# Patient Record
Sex: FEMALE | Race: WHITE | Employment: OTHER | URBAN - METROPOLITAN AREA
[De-identification: names, ages, dates, MRNs, and addresses within clinical notes are randomized per-mention and may not be internally consistent; named-entity substitution may affect disease eponyms.]

---

## 2017-02-28 ENCOUNTER — HOSPITAL ENCOUNTER (OUTPATIENT)
Dept: GENERAL RADIOLOGY | Age: 67
Discharge: HOME | End: 2017-02-28
Attending: NURSE PRACTITIONER | Admitting: NURSE PRACTITIONER

## 2017-02-28 DIAGNOSIS — R05.9 COUGH: ICD-10-CM

## 2017-06-08 ENCOUNTER — HOSPITAL ENCOUNTER (OUTPATIENT)
Dept: GENERAL RADIOLOGY | Age: 67
Discharge: HOME | End: 2017-06-08
Attending: INTERNAL MEDICINE | Admitting: INTERNAL MEDICINE

## 2017-06-08 LAB
ALBUMIN SERPL-MCNC: 4.3 GM/DL (ref 3.4–5)
ANION GAP SERPL CALCULATED.3IONS-SCNC: 17 MMOL/L (ref 4–16)
BACTERIA: NEGATIVE /HPF
BASOPHILS ABSOLUTE: 0.1 K/CU MM
BASOPHILS RELATIVE PERCENT: 0.6 % (ref 0–1)
BILIRUBIN URINE: NEGATIVE MG/DL
BLOOD, URINE: NEGATIVE
BUN BLDV-MCNC: 29 MG/DL (ref 6–23)
CALCIUM SERPL-MCNC: 10.4 MG/DL (ref 8.3–10.6)
CHLORIDE BLD-SCNC: 97 MMOL/L (ref 99–110)
CLARITY: CLEAR
CO2: 26 MMOL/L (ref 21–32)
COLOR: ABNORMAL
CREAT SERPL-MCNC: 1.5 MG/DL (ref 0.6–1.1)
DIFFERENTIAL TYPE: ABNORMAL
EOSINOPHILS ABSOLUTE: 0.5 K/CU MM
EOSINOPHILS RELATIVE PERCENT: 3.9 % (ref 0–3)
GFR AFRICAN AMERICAN: 42 ML/MIN/1.73M2
GFR NON-AFRICAN AMERICAN: 35 ML/MIN/1.73M2
GLUCOSE BLD-MCNC: 216 MG/DL (ref 70–140)
GLUCOSE, URINE: NEGATIVE MG/DL
HCT VFR BLD CALC: 42.6 % (ref 37–47)
HEMOGLOBIN: 14.1 GM/DL (ref 12.5–16)
HYALINE CASTS: 2 /LPF
IMMATURE NEUTROPHIL %: 0.6 % (ref 0–0.43)
KETONES, URINE: NEGATIVE MG/DL
LEUKOCYTE ESTERASE, URINE: ABNORMAL
LYMPHOCYTES ABSOLUTE: 3.1 K/CU MM
LYMPHOCYTES RELATIVE PERCENT: 25.3 % (ref 24–44)
MCH RBC QN AUTO: 33 PG (ref 27–31)
MCHC RBC AUTO-ENTMCNC: 33.1 % (ref 32–36)
MCV RBC AUTO: 99.8 FL (ref 78–100)
MONOCYTES ABSOLUTE: 1.1 K/CU MM
MONOCYTES RELATIVE PERCENT: 8.7 % (ref 0–4)
MUCUS: ABNORMAL HPF
NITRITE URINE, QUANTITATIVE: NEGATIVE
NUCLEATED RBC %: 0 %
PDW BLD-RTO: 13.1 % (ref 11.7–14.9)
PH, URINE: 5 (ref 5–8)
PHOSPHORUS: 4.6 MG/DL (ref 2.5–4.9)
PLATELET # BLD: 320 K/CU MM (ref 140–440)
PMV BLD AUTO: 11.1 FL (ref 7.5–11.1)
POTASSIUM SERPL-SCNC: 4.3 MMOL/L (ref 3.5–5.1)
PROTEIN UA: NEGATIVE MG/DL
RBC # BLD: 4.27 M/CU MM (ref 4.2–5.4)
RBC URINE: 1 /HPF (ref 0–6)
SEGMENTED NEUTROPHILS ABSOLUTE COUNT: 7.5 K/CU MM
SEGMENTED NEUTROPHILS RELATIVE PERCENT: 60.9 % (ref 36–66)
SODIUM BLD-SCNC: 140 MMOL/L (ref 135–145)
SPECIFIC GRAVITY UA: 1.01 (ref 1–1.03)
SQUAMOUS EPITHELIAL: 5 /HPF
TOTAL IMMATURE NEUTOROPHIL: 0.07 K/CU MM
TOTAL NUCLEATED RBC: 0 K/CU MM
TRANSITIONAL EPITHELIAL: <1 /HPF
UROBILINOGEN, URINE: NORMAL MG/DL (ref 0.2–1)
WBC # BLD: 12.3 K/CU MM (ref 4–10.5)
WBC UA: 5 /HPF (ref 0–5)

## 2017-12-07 ENCOUNTER — HOSPITAL ENCOUNTER (OUTPATIENT)
Dept: GENERAL RADIOLOGY | Age: 67
Discharge: HOME | End: 2017-12-07
Attending: INTERNAL MEDICINE | Admitting: INTERNAL MEDICINE

## 2017-12-07 LAB
ALBUMIN SERPL-MCNC: 4.4 GM/DL (ref 3.4–5)
ANION GAP SERPL CALCULATED.3IONS-SCNC: 15 MMOL/L (ref 4–16)
BACTERIA: ABNORMAL /HPF
BILIRUBIN URINE: NEGATIVE MG/DL
BLOOD, URINE: NEGATIVE
BUN BLDV-MCNC: 35 MG/DL (ref 6–23)
CALCIUM SERPL-MCNC: 10.3 MG/DL (ref 8.3–10.6)
CHLORIDE BLD-SCNC: 98 MMOL/L (ref 99–110)
CLARITY: ABNORMAL
CO2: 26 MMOL/L (ref 21–32)
COLOR: ABNORMAL
CREAT SERPL-MCNC: 1.5 MG/DL (ref 0.6–1.1)
CREATININE URINE: 89.6 MG/DL (ref 28–217)
GFR AFRICAN AMERICAN: 42 ML/MIN/1.73M2
GFR NON-AFRICAN AMERICAN: 35 ML/MIN/1.73M2
GLUCOSE BLD-MCNC: 257 MG/DL (ref 70–99)
GLUCOSE, URINE: 50 MG/DL
HYALINE CASTS: 2 /LPF
KETONES, URINE: NEGATIVE MG/DL
LEUKOCYTE ESTERASE, URINE: NEGATIVE
MAGNESIUM: 2.3 MG/DL (ref 1.8–2.4)
MUCUS: ABNORMAL HPF
NITRITE URINE, QUANTITATIVE: NEGATIVE
PH, URINE: 5 (ref 5–8)
PHOSPHORUS: 4.5 MG/DL (ref 2.5–4.9)
POTASSIUM SERPL-SCNC: 4.4 MMOL/L (ref 3.5–5.1)
PROT/CREAT RATIO, UR: 0.2
PROTEIN UA: NEGATIVE MG/DL
RBC URINE: 2 /HPF (ref 0–6)
SODIUM BLD-SCNC: 139 MMOL/L (ref 135–145)
SPECIFIC GRAVITY UA: 1.01 (ref 1–1.03)
SQUAMOUS EPITHELIAL: 3 /HPF
TRANSITIONAL EPITHELIAL: <1 /HPF
TRICHOMONAS: ABNORMAL /HPF
URINE TOTAL PROTEIN: 14.6 MG/DL
UROBILINOGEN, URINE: NORMAL MG/DL (ref 0.2–1)
WBC UA: 3 /HPF (ref 0–5)

## 2018-06-05 ENCOUNTER — HOSPITAL ENCOUNTER (OUTPATIENT)
Dept: GENERAL RADIOLOGY | Age: 68
Discharge: HOME | End: 2018-06-05
Attending: INTERNAL MEDICINE | Admitting: INTERNAL MEDICINE

## 2018-06-05 LAB
ALBUMIN SERPL-MCNC: 4.4 GM/DL (ref 3.4–5)
ANION GAP SERPL CALCULATED.3IONS-SCNC: 16 MMOL/L (ref 4–16)
BACTERIA: NEGATIVE /HPF
BASOPHILS ABSOLUTE: 0.1 K/CU MM
BASOPHILS RELATIVE PERCENT: 0.6 % (ref 0–1)
BILIRUBIN URINE: NEGATIVE MG/DL
BLOOD, URINE: NEGATIVE
BUN BLDV-MCNC: 29 MG/DL (ref 6–23)
CALCIUM OXALATE CRYSTALS: ABNORMAL /HPF
CALCIUM SERPL-MCNC: 9.8 MG/DL (ref 8.3–10.6)
CHLORIDE BLD-SCNC: 100 MMOL/L (ref 99–110)
CLARITY: CLEAR
CO2: 25 MMOL/L (ref 21–32)
COLOR: ABNORMAL
CREAT SERPL-MCNC: 1.2 MG/DL (ref 0.6–1.1)
DIFFERENTIAL TYPE: ABNORMAL
EOSINOPHILS ABSOLUTE: 0.4 K/CU MM
EOSINOPHILS RELATIVE PERCENT: 3 % (ref 0–3)
GFR AFRICAN AMERICAN: 54 ML/MIN/1.73M2
GFR NON-AFRICAN AMERICAN: 45 ML/MIN/1.73M2
GLUCOSE BLD-MCNC: 237 MG/DL (ref 70–99)
GLUCOSE, URINE: 50 MG/DL
HCT VFR BLD CALC: 42.9 % (ref 37–47)
HEMOGLOBIN: 13.8 GM/DL (ref 12.5–16)
IMMATURE NEUTROPHIL %: 0.5 % (ref 0–0.43)
KETONES, URINE: NEGATIVE MG/DL
LEUKOCYTE ESTERASE, URINE: NEGATIVE
LYMPHOCYTES ABSOLUTE: 2.4 K/CU MM
LYMPHOCYTES RELATIVE PERCENT: 19.2 % (ref 24–44)
MAGNESIUM: 2.1 MG/DL (ref 1.8–2.4)
MCH RBC QN AUTO: 32.9 PG (ref 27–31)
MCHC RBC AUTO-ENTMCNC: 32.2 % (ref 32–36)
MCV RBC AUTO: 102.4 FL (ref 78–100)
MONOCYTES ABSOLUTE: 1 K/CU MM
MONOCYTES RELATIVE PERCENT: 7.7 % (ref 0–4)
NITRITE URINE, QUANTITATIVE: NEGATIVE
NUCLEATED RBC %: 0 %
PDW BLD-RTO: 13.5 % (ref 11.7–14.9)
PH, URINE: 6 (ref 5–8)
PHOSPHORUS: 3.6 MG/DL (ref 2.5–4.9)
PLATELET # BLD: 323 K/CU MM (ref 140–440)
PMV BLD AUTO: 11.6 FL (ref 7.5–11.1)
POTASSIUM SERPL-SCNC: 4.6 MMOL/L (ref 3.5–5.1)
PROTEIN UA: NEGATIVE MG/DL
RBC # BLD: 4.19 M/CU MM (ref 4.2–5.4)
RBC URINE: <1 /HPF (ref 0–6)
SEGMENTED NEUTROPHILS ABSOLUTE COUNT: 8.7 K/CU MM
SEGMENTED NEUTROPHILS RELATIVE PERCENT: 69 % (ref 36–66)
SODIUM BLD-SCNC: 141 MMOL/L (ref 135–145)
SPECIFIC GRAVITY UA: 1.01 (ref 1–1.03)
SQUAMOUS EPITHELIAL: 2 /HPF
TOTAL IMMATURE NEUTOROPHIL: 0.06 K/CU MM
TOTAL NUCLEATED RBC: 0 K/CU MM
TRICHOMONAS: ABNORMAL /HPF
UROBILINOGEN, URINE: NORMAL MG/DL (ref 0.2–1)
WBC # BLD: 12.5 K/CU MM (ref 4–10.5)
WBC UA: 4 /HPF (ref 0–5)

## 2018-12-04 ENCOUNTER — HOSPITAL ENCOUNTER (OUTPATIENT)
Age: 68
Discharge: HOME | End: 2018-12-04
Payer: MEDICARE

## 2018-12-04 DIAGNOSIS — E11.8 TYPE 2 DIABETES MELLITUS WITH COMPLICATION, WITHOUT LONG-TERM CURRENT USE OF INSULIN (HCC): ICD-10-CM

## 2018-12-04 DIAGNOSIS — N18.30 CHRONIC KIDNEY DISEASE, STAGE III (MODERATE) (HCC): ICD-10-CM

## 2018-12-04 DIAGNOSIS — N18.30 HYPERTENSIVE KIDNEY DISEASE WITH CKD STAGE III (HCC): ICD-10-CM

## 2018-12-04 DIAGNOSIS — E66.3 PATIENT OVERWEIGHT: ICD-10-CM

## 2018-12-04 DIAGNOSIS — F41.9 ANXIETY: ICD-10-CM

## 2018-12-04 DIAGNOSIS — I12.9 HYPERTENSIVE KIDNEY DISEASE WITH CKD STAGE III (HCC): ICD-10-CM

## 2018-12-04 DIAGNOSIS — I25.10 CORONARY ARTERY DISEASE INVOLVING NATIVE CORONARY ARTERY OF NATIVE HEART WITHOUT ANGINA PECTORIS: ICD-10-CM

## 2018-12-04 DIAGNOSIS — N18.30 CKD (CHRONIC KIDNEY DISEASE), STAGE III (HCC): ICD-10-CM

## 2018-12-04 LAB
ALBUMIN SERPL-MCNC: 4.4 GM/DL (ref 3.4–5)
ANION GAP SERPL CALCULATED.3IONS-SCNC: 13 MMOL/L (ref 4–16)
BACTERIA: NEGATIVE /HPF
BILIRUBIN URINE: NEGATIVE MG/DL
BLOOD, URINE: NEGATIVE
BUN BLDV-MCNC: 32 MG/DL (ref 6–23)
CALCIUM SERPL-MCNC: 9.8 MG/DL (ref 8.3–10.6)
CHLORIDE BLD-SCNC: 104 MMOL/L (ref 99–110)
CLARITY: CLEAR
CO2: 25 MMOL/L (ref 21–32)
COLOR: YELLOW
CREAT SERPL-MCNC: 1.3 MG/DL (ref 0.6–1.1)
GFR AFRICAN AMERICAN: 49 ML/MIN/1.73M2
GFR NON-AFRICAN AMERICAN: 41 ML/MIN/1.73M2
GLUCOSE BLD-MCNC: 233 MG/DL (ref 70–99)
GLUCOSE, URINE: 50 MG/DL
KETONES, URINE: NEGATIVE MG/DL
LEUKOCYTE ESTERASE, URINE: ABNORMAL
MAGNESIUM: 2.3 MG/DL (ref 1.8–2.4)
MUCUS: ABNORMAL HPF
NITRITE URINE, QUANTITATIVE: NEGATIVE
PH, URINE: 6 (ref 5–8)
PHOSPHORUS: 3.4 MG/DL (ref 2.5–4.9)
POTASSIUM SERPL-SCNC: 4.7 MMOL/L (ref 3.5–5.1)
PROTEIN UA: NEGATIVE MG/DL
RBC URINE: <1 /HPF (ref 0–6)
SODIUM BLD-SCNC: 142 MMOL/L (ref 135–145)
SPECIFIC GRAVITY UA: 1.01 (ref 1–1.03)
SQUAMOUS EPITHELIAL: 1 /HPF
TRICHOMONAS: ABNORMAL /HPF
UROBILINOGEN, URINE: NORMAL MG/DL (ref 0.2–1)
WBC UA: 3 /HPF (ref 0–5)

## 2018-12-04 PROCEDURE — 83735 ASSAY OF MAGNESIUM: CPT

## 2018-12-04 PROCEDURE — 36415 COLL VENOUS BLD VENIPUNCTURE: CPT

## 2018-12-04 PROCEDURE — 80069 RENAL FUNCTION PANEL: CPT

## 2018-12-04 PROCEDURE — 81001 URINALYSIS AUTO W/SCOPE: CPT

## 2020-06-16 ENCOUNTER — HOSPITAL ENCOUNTER (OUTPATIENT)
Age: 70
Discharge: HOME | End: 2020-06-16
Payer: COMMERCIAL

## 2020-06-16 LAB
ALBUMIN SERPL-MCNC: 4.2 GM/DL (ref 3.4–5)
ANION GAP SERPL CALCULATED.3IONS-SCNC: 12 MMOL/L (ref 4–16)
BACTERIA: NEGATIVE /HPF
BILIRUBIN URINE: NEGATIVE MG/DL
BLOOD, URINE: NEGATIVE
BUN BLDV-MCNC: 30 MG/DL (ref 6–23)
CALCIUM SERPL-MCNC: 9.4 MG/DL (ref 8.3–10.6)
CHLORIDE BLD-SCNC: 105 MMOL/L (ref 99–110)
CLARITY: CLEAR
CO2: 25 MMOL/L (ref 21–32)
COLOR: ABNORMAL
CREAT SERPL-MCNC: 1.3 MG/DL (ref 0.6–1.1)
CREATININE URINE: 64.7 MG/DL (ref 28–217)
GFR AFRICAN AMERICAN: 49 ML/MIN/1.73M2
GFR NON-AFRICAN AMERICAN: 41 ML/MIN/1.73M2
GLUCOSE BLD-MCNC: 220 MG/DL (ref 70–99)
GLUCOSE, URINE: 50 MG/DL
KETONES, URINE: NEGATIVE MG/DL
LEUKOCYTE ESTERASE, URINE: NEGATIVE
MAGNESIUM: 2.5 MG/DL (ref 1.8–2.4)
MUCUS: ABNORMAL HPF
NITRITE URINE, QUANTITATIVE: NEGATIVE
PH, URINE: 6 (ref 5–8)
PHOSPHORUS: 3.3 MG/DL (ref 2.5–4.9)
POTASSIUM SERPL-SCNC: 4.5 MMOL/L (ref 3.5–5.1)
PROT/CREAT RATIO, UR: 0.2
PROTEIN UA: NEGATIVE MG/DL
RBC URINE: <1 /HPF (ref 0–6)
SODIUM BLD-SCNC: 142 MMOL/L (ref 135–145)
SPECIFIC GRAVITY UA: 1.01 (ref 1–1.03)
SQUAMOUS EPITHELIAL: 1 /HPF
TRANSITIONAL EPITHELIAL: <1 /HPF
TRICHOMONAS: ABNORMAL /HPF
URINE TOTAL PROTEIN: 12.4 MG/DL
UROBILINOGEN, URINE: NORMAL MG/DL (ref 0.2–1)
WBC UA: 1 /HPF (ref 0–5)

## 2020-06-16 PROCEDURE — 36415 COLL VENOUS BLD VENIPUNCTURE: CPT

## 2020-06-16 PROCEDURE — 82570 ASSAY OF URINE CREATININE: CPT

## 2020-06-16 PROCEDURE — 80048 BASIC METABOLIC PNL TOTAL CA: CPT

## 2020-06-16 PROCEDURE — 82040 ASSAY OF SERUM ALBUMIN: CPT

## 2020-06-16 PROCEDURE — 83735 ASSAY OF MAGNESIUM: CPT

## 2020-06-16 PROCEDURE — 84100 ASSAY OF PHOSPHORUS: CPT

## 2020-06-16 PROCEDURE — 81001 URINALYSIS AUTO W/SCOPE: CPT

## 2020-06-16 PROCEDURE — 84156 ASSAY OF PROTEIN URINE: CPT

## 2021-05-13 DIAGNOSIS — I25.10 CORONARY ARTERY DISEASE INVOLVING NATIVE CORONARY ARTERY OF NATIVE HEART WITHOUT ANGINA PECTORIS: ICD-10-CM

## 2021-05-13 DIAGNOSIS — E11.8 TYPE 2 DIABETES MELLITUS WITH COMPLICATION, WITHOUT LONG-TERM CURRENT USE OF INSULIN (HCC): ICD-10-CM

## 2021-05-13 DIAGNOSIS — E66.3 PATIENT OVERWEIGHT: ICD-10-CM

## 2021-05-13 DIAGNOSIS — F41.9 ANXIETY: ICD-10-CM

## 2021-05-13 DIAGNOSIS — I12.9 HYPERTENSIVE KIDNEY DISEASE WITH STAGE 3 CHRONIC KIDNEY DISEASE, UNSPECIFIED WHETHER STAGE 3A OR 3B CKD (HCC): ICD-10-CM

## 2021-05-13 DIAGNOSIS — N18.30 HYPERTENSIVE KIDNEY DISEASE WITH STAGE 3 CHRONIC KIDNEY DISEASE, UNSPECIFIED WHETHER STAGE 3A OR 3B CKD (HCC): ICD-10-CM

## 2021-05-13 DIAGNOSIS — N18.32 STAGE 3B CHRONIC KIDNEY DISEASE (HCC): ICD-10-CM

## 2021-05-13 LAB
ALBUMIN SERPL-MCNC: 4.4 G/DL (ref 3.4–5)
ANION GAP SERPL CALCULATED.3IONS-SCNC: 12 MMOL/L (ref 3–16)
BILIRUBIN URINE: NEGATIVE
BLOOD, URINE: NEGATIVE
BUN BLDV-MCNC: 25 MG/DL (ref 7–20)
CALCIUM SERPL-MCNC: 9.8 MG/DL (ref 8.3–10.6)
CHLORIDE BLD-SCNC: 101 MMOL/L (ref 99–110)
CLARITY: CLEAR
CO2: 25 MMOL/L (ref 21–32)
COLOR: YELLOW
CREAT SERPL-MCNC: 1.2 MG/DL (ref 0.6–1.2)
CREATININE URINE: 56.6 MG/DL (ref 28–259)
GFR AFRICAN AMERICAN: 54
GFR NON-AFRICAN AMERICAN: 44
GLUCOSE BLD-MCNC: 154 MG/DL (ref 70–99)
GLUCOSE URINE: NEGATIVE MG/DL
KETONES, URINE: NEGATIVE MG/DL
LEUKOCYTE ESTERASE, URINE: NEGATIVE
MAGNESIUM: 2.3 MG/DL (ref 1.8–2.4)
MICROSCOPIC EXAMINATION: NORMAL
NITRITE, URINE: NEGATIVE
PH UA: 6 (ref 5–8)
PHOSPHORUS: 3.6 MG/DL (ref 2.5–4.9)
POTASSIUM SERPL-SCNC: 4.1 MMOL/L (ref 3.5–5.1)
PROTEIN PROTEIN: 13 MG/DL
PROTEIN UA: NEGATIVE MG/DL
PROTEIN/CREAT RATIO: 0.2 MG/DL
SODIUM BLD-SCNC: 138 MMOL/L (ref 136–145)
SPECIFIC GRAVITY UA: 1.01 (ref 1–1.03)
URINE TYPE: NORMAL
UROBILINOGEN, URINE: 0.2 E.U./DL

## 2021-06-10 PROBLEM — I48.0 PAROXYSMAL ATRIAL FIBRILLATION (HCC): Status: ACTIVE | Noted: 2021-06-10

## 2021-10-23 ENCOUNTER — HOSPITAL ENCOUNTER (EMERGENCY)
Age: 71
Discharge: HOME | End: 2021-10-23
Payer: MEDICARE

## 2021-10-23 VITALS
TEMPERATURE: 98.1 F | BODY MASS INDEX: 49.53 KG/M2 | RESPIRATION RATE: 16 BRPM | HEART RATE: 74 BPM | SYSTOLIC BLOOD PRESSURE: 164 MMHG | DIASTOLIC BLOOD PRESSURE: 72 MMHG | OXYGEN SATURATION: 95 % | WEIGHT: 293 LBS

## 2021-10-23 DIAGNOSIS — E16.2 HYPOGLYCEMIA: Primary | ICD-10-CM

## 2021-10-23 LAB
GLUCOSE BLD-MCNC: 118 MG/DL
GLUCOSE BLD-MCNC: 118 MG/DL (ref 70–99)
GLUCOSE BLD-MCNC: 137 MG/DL
GLUCOSE BLD-MCNC: 137 MG/DL (ref 70–99)
GLUCOSE BLD-MCNC: 72 MG/DL (ref 70–99)

## 2021-10-23 PROCEDURE — 82962 GLUCOSE BLOOD TEST: CPT

## 2021-10-23 PROCEDURE — 99283 EMERGENCY DEPT VISIT LOW MDM: CPT

## 2021-10-23 ASSESSMENT — PAIN SCALES - GENERAL: PAINLEVEL_OUTOF10: 5

## 2021-10-23 NOTE — ED PROVIDER NOTES
Emergency [address] EMERGENCY DEPARTMENT    Patient: Leeann Olivier  MRN: [id number]  : 1950  Date of Evaluation: 10/23/2021  ED Provider: Robert Young PA-C    Chief Complaint       Chief Complaint   Patient presents with    Hypoglycemia     EMS read 76; patient states her home monitor read [address] is a 70 y.o. female who presents to the emergency department for hypoglycemia. Patient states she woke up at 2320pm to go to the bathroom and felt weak, so she checked her sugar. She states her monitor read \"0\" so she started eating as much as she could--Twinkies, cinnamon twists, peanut butter, and 3 apple juices. She states the highest her glucometer read was 48 and then it began to trend downward, so she decided to call EMS. Per EMS, patient's glucose was 68. She states she last took her insulin--both Levemir and Novolog at 2000pm.  At this time, she complains of GI upset from eating so much. Denies fever, chills, dizziness, headache, cp, sob, n/v/d.      ROS     CONSTITUTIONAL:  Denies fever. RESPIRATORY:  Denies any shortness of breath. CARDIOVASCULAR:  Denies chest pain. GI:  Denies nausea or vomiting. :  Denies urinary symptoms.     Past History     Past Medical History:   Diagnosis Date    [facility]adder [facility] ([facility]     Brain cyst     CAD (coronary artery disease)     CHF (congestive heart failure) (HCC)     Chronic kidney disease     Congenital heart disease     Diabetes mellitus ([address]     Gastroparesis     GERD (gastroesophageal reflux disease)     Hiatal hernia     Hyperlipidemia     Hypertension     Hypothyroid     Kidney stone     Larynx cancer (HCC)     Neuropathy     Radiculopathy     Seizures ([facility]     UTI (urinary tract infection)      Past Surgical History:   Procedure Laterality Date    APPENDECTOMY      BLADDER TUMOR EXCISION      CORONARY ANGIOPLASTY WITH STENT PLACEMENT  2015     Social History     Socioeconomic History    Marital status: Single     Spouse name: Not on file    Number of children: 0    Years of education: Not on file    Highest education level: Not on file   Occupational History    Not on file   Tobacco Use    Smoking status: Former Smoker     Packs/day: 0.25     Years: 11.00     Pack years: 2.75     Quit date: 1985     Years since quittin.3    Smokeless tobacco: Former User   Substance and Sexual Activity    Alcohol use: No    Drug use: No    Sexual activity: Yes     Partners: Male   Other Topics Concern    Not on file   Social History Narrative    Not on file     Social Determinants of Health     Financial Resource Strain:     Difficulty of Paying Living Expenses:    Food Insecurity:     Worried About Running Out of Food in the Last Year:     Ran Out of Food in the Last Year:    Transportation Needs:     Lack of Transportation (Medical):      Lack of Transportation (Non-Medical):    Physical Activity:     Days of Exercise per Week:     Minutes of Exercise per Session:    Stress:     Feeling of Stress :    Social Connections:     Frequency of Communication with Friends and Family:     Frequency of Social Gatherings with Friends and Family:     Attends Yazidi Services:     Active Member of Clubs or Organizations:     Attends Club or Organization Meetings:     Marital Status:    Intimate Partner Violence:     Fear of Current or Ex-Partner:     Emotionally Abused:     Physically Abused:     Sexually Abused:        Medications/Allergies     Previous Medications    AMLODIPINE (NORVASC) 5 MG TABLET    Take 5 mg by mouth daily     ASPIRIN 81 MG CHEWABLE TABLET    Take 1 tablet by mouth daily    CARVEDILOL (COREG) 6.25 MG TABLET    Take 1 tablet by mouth 2 times daily (with meals)    CLONIDINE (CATAPRES) 0.2 MG TABLET    Take 0.2 mg by mouth nightly     ELIQUIS 5 MG TABS TABLET    TAKE 1 TABLET BY MOUTH TWICE DAILY    FENOFIBRATE (TRICOR) 54 MG TABLET    Take 54 mg by mouth daily s [facility]: Please dispense generic fenofibrate unless prescriber denote    FLUTICASONE (FLONASE) 50 MCG/ACT NASAL SPRAY    1 spray by Nasal route as needed     FUROSEMIDE (LASIX) 40 MG TABLET    Take 40 mg by mouth Twice a Week     GLIMEPIRIDE (AMARYL) 4 MG TABLET    Take 4 mg by mouth 2 times daily     INSULIN DETEMIR (LEVEMIR) 100 UNIT/ML INJECTION VIAL    Inject into the skin 2 times daily 45 units in the am and 50 units in the pm    LEVOTHYROXINE (SYNTHROID) 25 MCG TABLET    Take 25 mcg by mouth Daily. LORAZEPAM (ATIVAN) 0.5 MG TABLET    Take 0.5 mg by mouth as needed     LOSARTAN (COZAAR) 50 MG TABLET    Take 100 mg by mouth daily     NONFORMULARY    Take 1 capsule by mouth daily [facility]    NOVOLOG 100 UNIT/ML INJECTION VIAL    Inject 15 Units into the skin 4 times daily     SIMVASTATIN (ZOCOR) 20 MG TABLET    Take 1 tablet by mouth nightly    VENTOLIN  (90 BASE) MCG/ACT INHALER    as needed     VITAMIN D 1000 UNITS CAPS    Take 1 capsule by mouth daily     Allergies   Allergen Reactions    Influenza Vaccines Anaphylaxis    Pneumococcal Vaccines Anaphylaxis    Codeine     Demerol     Epinephrine     Humalog [Insulin Lispro]     Iv Dye [Iodides]     Metformin     Penicillins     Toujeo Solostar [Insulin Glargine]     Vicodin [Hydrocodone-Acetaminophen]     Victoza [Liraglutide] Other (See Comments)    Adhesive Tape Rash        Physical Exam       ED Triage Vitals [10/23/21 0057]   BP Temp Temp Source Pulse Resp SpO2 Height Weight   (!) 157/55 98.1 °F (36.7 °C) Oral 72 16 97 % -- --     GENERAL APPEARANCE:  Well-developed, well-nourished, no acute distress. HEAD:  NC/AT. EYES:  Sclera anicteric. ENT:  Ears, nose, mouth normal.     NECK:  Supple. CARDIO:  RRR. LUNGS:   CTAB. Respirations unlabored. ABDOMEN:  Soft, non-distended, non-tender. BS active. EXTREMITIES:  No acute deformities. SKIN:  Warm and dry. NEUROLOGICAL:  Alert and oriented. PSYCHIATRIC:  Normal mood. Diagnostics     Labs:  Results for orders placed or performed during the hospital encounter of 10/23/21   POCT Glucose   Result Value Ref Range    POC Glucose 72 70 - 99 MG/DL   POCT Glucose   Result Value Ref Range    Glucose 118 mg/dL   POCT Glucose   Result Value Ref Range    POC Glucose 118 (H) 70 - 99 MG/DL   POC Blood Glucose   Result Value Ref Range    Glucose 137 mg/dL   POCT Glucose   Result Value Ref Range    POC Glucose 137 (H) 70 - 99 MG/DL       ED Course and MDM   -  Patient seen and evaluated in the emergency department. -  Triage and nursing notes reviewed and incorporated. -  Old chart records reviewed and incorporated. -  Supervising physician was Dr. Ann-Marie Nicole. Patient was seen independently. -  Patient presents with an initial glucose of 72. She does not want to eat anything else and she does not want any intervention. She was agreeable to observation and repeat glucose checks to ensure stability. Patient observed in the department for over 2 hours. Glucose 137 on final check. She voiced readiness to go home. Advised on continued monitoring and compliance with medication regimen. FU with PCP, return here as needed. She is agreeable with plan of care and disposition.  -  Disposition:  Home    In light of current events, I did utilize appropriate PPE (including N95 and surgical face mask, safety glasses, and gloves, as recommended by the health facility/national standard best practice, during my bedside interactions with the patient. Final Impression      1.  Hypoglycemia          DISPOSITION Decision To Discharge 10/23/2021 02:54:21 AM      [id number], Sander Barajas  10/23/21 7874

## 2021-10-23 NOTE — ED NOTES
Patient presents to the ER with c/o of hypoglycemia. Patient woke up from sleeping at 11:30 and was dizzy and diaphoretic. Patient states her home meter read LOW. Patient drank apple juice and ate some twinkies. Patients BG in the EMS squad was 68.  [address] [facility] [address] uppon arrival read [place]  10/23/21 [id number]

## 2021-10-23 NOTE — ED NOTES
Discharge instructions explained. Patient verbalized understanding and denies any other concerns or complaints at this time. Patient vital signs stable and no acute signs or symptoms of distress noted at discharge. Patient deemed clinically stable. Patient d/c home with family.      Sanjuanita Stein RN  10/23/21 7598

## 2021-11-02 ENCOUNTER — INITIAL CONSULT (OUTPATIENT)
Dept: CARDIOLOGY CLINIC | Age: 71
End: 2021-11-02
Payer: MEDICARE

## 2021-11-02 VITALS
BODY MASS INDEX: 44.41 KG/M2 | WEIGHT: 293 LBS | HEART RATE: 65 BPM | HEIGHT: 68 IN | DIASTOLIC BLOOD PRESSURE: 60 MMHG | SYSTOLIC BLOOD PRESSURE: 138 MMHG

## 2021-11-02 DIAGNOSIS — I25.10 CORONARY ARTERY DISEASE INVOLVING NATIVE CORONARY ARTERY OF NATIVE HEART WITHOUT ANGINA PECTORIS: Primary | ICD-10-CM

## 2021-11-02 DIAGNOSIS — E11.8 TYPE 2 DIABETES MELLITUS WITH COMPLICATION, WITHOUT LONG-TERM CURRENT USE OF INSULIN (HCC): ICD-10-CM

## 2021-11-02 DIAGNOSIS — I10 PRIMARY HYPERTENSION: ICD-10-CM

## 2021-11-02 DIAGNOSIS — I48.0 PAROXYSMAL ATRIAL FIBRILLATION (HCC): ICD-10-CM

## 2021-11-02 DIAGNOSIS — N18.32 STAGE 3B CHRONIC KIDNEY DISEASE (HCC): ICD-10-CM

## 2021-11-02 DIAGNOSIS — N18.30 HYPERTENSIVE KIDNEY DISEASE WITH STAGE 3 CHRONIC KIDNEY DISEASE, UNSPECIFIED WHETHER STAGE 3A OR 3B CKD (HCC): ICD-10-CM

## 2021-11-02 DIAGNOSIS — E66.01 MORBID OBESITY (HCC): ICD-10-CM

## 2021-11-02 DIAGNOSIS — I12.9 HYPERTENSIVE KIDNEY DISEASE WITH STAGE 3 CHRONIC KIDNEY DISEASE, UNSPECIFIED WHETHER STAGE 3A OR 3B CKD (HCC): ICD-10-CM

## 2021-11-02 PROCEDURE — 93000 ELECTROCARDIOGRAM COMPLETE: CPT | Performed by: INTERNAL MEDICINE

## 2021-11-02 PROCEDURE — 99213 OFFICE O/P EST LOW 20 MIN: CPT | Performed by: INTERNAL MEDICINE

## 2021-11-02 RX ORDER — AMLODIPINE BESYLATE 10 MG/1
10 TABLET ORAL DAILY
COMMUNITY
Start: 2021-08-31 | End: 2022-01-26

## 2021-11-02 NOTE — PATIENT INSTRUCTIONS
1. A-fib: Patient is rate controlled & anticoagulated. Currently in NSR. To continue current management. Will procure Echo results from [facility]. 2. CAD: ? Asymptomatic. CPM, will review stress test results as soon as available. 3. HYPERTENSION: Well controlled on current regimen. Will maintain the same. If swelling of the Legs worsens, may change Amlodipine. 4. Dyslipdemia: C/O muscle aches & pains. Will get Lipid profile from [address] [place] [address]. If Symptoms worsen may have to change statin. Patient says she wants to try Co-Q10.  5. DM: Per PMD.  6. CRF: Dr. Boone Rater manages. I spent about 30 min. of time in review of the available data, chart Prep., interviewing patient, obtaining history, performing physical exam, going through decision making analysis for assessment & plans of management on this patient. Office Visit in 3 months.

## 2021-11-02 NOTE — PROGRESS NOTES
[id number]-[id number] Score for Atrial Fibrillation Stroke Risk   Risk   Factors  Component Value   C CHF No 0   H HTN Yes 1   A2 Age >= 76 No,  (75 y.o.) 0   D DM Yes 1   S2 Prior Stroke/TIA No 0   V Vascular Disease No 0   A Age 74-69 Yes,  (75 y.o.) 1   Sc Sex female 1    [id number]  Score  4   Score last updated 11/2/21 45:67 AM EDT    Click here for a link to the UpToDate guideline \"Atrial Fibrillation: Anticoagulation therapy to prevent embolization    Disclaimer: Risk Score calculation is dependent on accuracy of patient problem list and past encounter diagnosis.

## 2021-11-02 NOTE — PROGRESS NOTES
CARDIAC CONSULT NOTE       Maty Delacruz  70 y.o.  female    Chief Complaint   Patient presents with    Atrial Fibrillation    Hyperlipidemia    Hypertension       Referring physician:  MONIQUE Smith CNP     Primary care physician:  MONIQUE Smith CNP    History of Present Illness:     Maty Delacruz is a 70 y.o. female referred for evaluation and management of Atrial fibrillation. Patient has known H/O Hypertension & CAD. Her CHADS score is 4. Apparently did not tolerate Elliquis due to side effects. She is now on Xarelto & tolerating well. She does get some fluttering now & than for few minutes. Gets chest pain during fluttering. C/O Leg edema for a while. She is on Amlodipine/She is a known Diabetic.     has a past medical history of Bladder cancer ([address] Brain cyst, CAD (coronary artery disease), CHF (congestive heart failure) ([address] Chronic kidney disease, Congenital heart disease, Diabetes mellitus ([address] Gastroparesis, GERD (gastroesophageal reflux disease), Hiatal hernia, Hyperlipidemia, Hypertension, Hypothyroid, Kidney stone, Larynx cancer ([address] Neuropathy, Radiculopathy, Seizures ([address] and UTI (urinary tract infection). has a past surgical history that includes bladder tumor excision; Appendectomy; and Coronary angioplasty with stent (july 2015). reports that she quit smoking about 36 years ago. She has a 2.75 pack-year smoking history. She has quit using smokeless tobacco. She reports that she does not drink alcohol and does not use drugs. family history includes Cancer in her father and mother; Diabetes in her father and mother; Heart Disease in her paternal grandfather; High Blood Pressure in her father and mother; High Cholesterol in her father and mother; Stroke in her mother. Review of Systems:   1. Cardiovascular: No chest pain, dyspnea on exertion, palpitations or loss of consciousness  2.  Respiratory: No cough or wheezing 3. Musculoskeletal:  No gait disturbance, weakness, muscle cramps, aches & pains or joint complaints  4. Neurological: No TIA or stroke symptoms  5. Psychiatric: No anxiety or depression  6. Hematologic/Lymphatic: No bleeding problems, blood clots or swollen lymph nodes    Physical Examination:    /60   Pulse 65   Ht 5' 7.5\" (1.715 m)   Wt (!) 319 lb 12.8 oz (145.1 kg)   BMI 49.35 kg/m²    Wt Readings from Last 3 Encounters:   11/02/21 (!) 319 lb 12.8 oz (145.1 kg)   10/23/21 (!) 321 lb (145.6 kg)   06/10/21 (!) 324 lb 3.2 oz (147.1 kg)     Body mass index is 49.35 kg/m². General Appearance: Morbidly obese/Well Nourished  1. Skin: It is warm & dry. No rashes noted. 2. Eyes: No conjunctival Pallor seen. No jaundice noted. 3. Neck: is supple there is no elevation of JVD. No thyromegaly  4. Respiratory:  · Resp Assessment: No abnormal findings. · Resp Auscultation: Vesicular breath sounds without rales or wheezing. 5. Cardiovascular:  · Auscultation: Normal S1 & S2, with 1-1/4 systolic murmur. · Carotid Arteries: No bruits present  · Abdominal Aorta: Non-palpable  · Pedal Pulses: 2+ and equal   6. Abdomen: Obese  · No masses or tenderness  · Liver/Spleen: No Abnormalities Noted, no organomegaly. 7. Musculoskeletal: No joint deformities. No muscle wasting  8. Extremities:  ·  No Cyanosis or Clubbing. There is significant edema present. 9. Rectal / genital:  ·  Deferred  10.  Neurological/Psychiatric:  · Oriented to time, place, and person  · Non-anxious    Lab Results   Component Value Date    CKTOTAL 61 01/11/2011    TROPONINT <0.010 04/07/2016    TROPONINT <0.010 08/29/2015     BNP:    Lab Results   Component Value Date    PROBNP 223.4 04/07/2016     PT/INR:  No results found for: Beegit  Lab Results   Component Value Date    LABA1C 8.4 (H) 07/01/2015    LABA1C 9.2 (H) 05/27/2011     Lab Results   Component Value Date    CHOL 162 11/19/2020    CHOL 240 (H) 07/01/2015    TRIG 93 11/19/2020    TRIG 155 (H) 07/01/2015    HDL 42 11/19/2020    HDL 37 (L) 07/01/2015    LDLCALC 101 (H) 11/19/2020    LDLCALC 171 (H) 07/23/2013    LDLDIRECT 185 (H) 07/01/2015    LDLDIRECT 126 (H) 05/27/2011     Lab Results   Component Value Date    ALT 13 11/19/2020    ALT 16 04/07/2016    AST 14 (L) 11/19/2020    AST 17 04/07/2016     BMP:    Lab Results   Component Value Date     05/13/2021     11/19/2020    K 4.1 05/13/2021    K 4.8 11/19/2020     05/13/2021     11/19/2020    CO2 25 05/13/2021    CO2 25 11/19/2020    BUN 25 05/13/2021    BUN 32 11/19/2020    CREATININE 1.2 05/13/2021    CREATININE 1.2 11/19/2020     CMP:    Lab Results   Component Value Date     05/13/2021     11/19/2020    K 4.1 05/13/2021    K 4.8 11/19/2020     05/13/2021     11/19/2020    CO2 25 05/13/2021    CO2 25 11/19/2020    BUN 25 05/13/2021    BUN 32 11/19/2020    CREATININE 1.2 05/13/2021    CREATININE 1.2 11/19/2020    PROT 6.9 11/19/2020    PROT 7.7 04/07/2016    PROT 6.9 11/27/2012    PROT 6.4 05/27/2011     TSH:    Lab Results   Component Value Date    TSH 4.10 11/19/2020    TSHHS 4.503 07/23/2013     CATH 7/2015 Dr. Edison Padilla  1. Left main is patent. 2.  Circumflex artery proximal 80% stenosis noted. Obtuse marginal branch 1  has 80% long tubular stenosis noted. The obtuse marginal 2 is a very  small-sized vessel. The obtuse marginal 3 has a long tubular 80% stenosis  noted. Left anterior descending has a mid 50% stenosis noted. JENIFFER-3 flow  is present. 3.  Right coronary artery is proximally 100% occluded with right-to-right and  left-to-right collateral circulation noted and EDP was normal.     PLAN:  The plan is to proceed with intervention of the circumflex artery. Echo: reportedly done at [facility]. Stress Cardiolyte: reportedly done at [facility]. EKG: normal sinus rhythm, 65/min, low voltage in precordial Leads& PRWP.      QUALITY MEASURES REVIEWED:  1.CAD:Patient is taking anti platelet agent:Yes  2. DYSLIPIDEMIA: Patient is on cholesterol lowering medication:Yes   3. Beta-Blocker therapy for CAD, if prior Myocardial Infarction:Yes   4. Counselled regarding smoking cessation. No   Patient does not Smoke. 5.Anticoagulation therapy (for A.Fib) Yes  6. Discussed weight management strategies. Assessment, Recommendations & Tests:     1. A-fib: Patient is rate controlled & anticoagulated. Currently in NSR. To continue current management. Will procure Echo results from [facility]. 2. CAD: ? Asymptomatic. CPM, will review stress test results as soon as available. 3. HYPERTENSION: Well controlled on current regimen. Will maintain the same. If swelling of the Legs worsens, may change Amlodipine. 4. Dyslipdemia: C/O muscle aches & pains. Will get Lipid profile from [address] [place] [address]. If Symptoms worsen may have to change statin. Patient says she wants to try Co-Q10.  5. DM: Per PMD.  6. CRF: Dr. Kartik Wilson manages. I spent about 30 min. of time in review of the available data, chart Prep., interviewing patient, obtaining history, performing physical exam, going through decision making analysis for assessment & plans of management on this patient. Office Visit in 3 months.      Manuel Aguila MD, 11/2/2021 10:59 AM

## 2021-11-02 NOTE — LETTER
[address]  [address] [address]  Phone: [phone]  Fax: [phone]    Juice Mejia MD    November 2, 2021     Tulio Castañeda, [address] [place], [place] [id number]  [facility]    Patient: Vandana Seay   MR Number: [id number]   YOB: 1950   Date of Visit: 11/2/2021       Dear Tulio Castañeda: Thank you for referring Jack Magana to me for evaluation/treatment. Below are the relevant portions of my assessment and plan of care. If you have questions, please do not hesitate to call me. I look forward to following Javier Ray along with you.     Sincerely,      Juice Mejia MD

## 2021-12-01 LAB
ALBUMIN SERPL-MCNC: 4.4 G/DL
ALP BLD-CCNC: 71 U/L
ALT SERPL-CCNC: 16 U/L
ANION GAP SERPL CALCULATED.3IONS-SCNC: NORMAL MMOL/L
AST SERPL-CCNC: 19 U/L
BASOPHILS ABSOLUTE: 0.1 /ΜL
BASOPHILS RELATIVE PERCENT: NORMAL
BILIRUB SERPL-MCNC: 0.4 MG/DL (ref 0.1–1.4)
BUN BLDV-MCNC: 25 MG/DL
CALCIUM SERPL-MCNC: 10.1 MG/DL
CHLORIDE BLD-SCNC: 104 MMOL/L
CHOLESTEROL, TOTAL: 175 MG/DL
CHOLESTEROL/HDL RATIO: NORMAL
CO2: 27 MMOL/L
CREAT SERPL-MCNC: 1.4 MG/DL
EOSINOPHILS ABSOLUTE: 0.3 /ΜL
EOSINOPHILS RELATIVE PERCENT: 2.6 %
GFR CALCULATED: NORMAL
GLUCOSE BLD-MCNC: 196 MG/DL
HCT VFR BLD CALC: 40.4 % (ref 36–46)
HDLC SERPL-MCNC: 42 MG/DL (ref 35–70)
HEMOGLOBIN: 13.5 G/DL (ref 12–16)
LDL CHOLESTEROL CALCULATED: 108 MG/DL (ref 0–160)
LYMPHOCYTES ABSOLUTE: 2.3 /ΜL
LYMPHOCYTES RELATIVE PERCENT: 21.3 %
MCH RBC QN AUTO: 32.5 PG
MCHC RBC AUTO-ENTMCNC: 33.4 G/DL
MCV RBC AUTO: 97.3 FL
MONOCYTES ABSOLUTE: 0.9 /ΜL
MONOCYTES RELATIVE PERCENT: 8 %
NEUTROPHILS ABSOLUTE: 7.3 /ΜL
NEUTROPHILS RELATIVE PERCENT: 67.1 %
NONHDLC SERPL-MCNC: NORMAL MG/DL
PLATELET # BLD: 348 K/ΜL
PMV BLD AUTO: 10.6 FL
POTASSIUM SERPL-SCNC: 4.6 MMOL/L
RBC # BLD: 4.15 10^6/ΜL
SODIUM BLD-SCNC: 141 MMOL/L
TOTAL PROTEIN: 6.9
TRIGL SERPL-MCNC: 126 MG/DL
TSH SERPL DL<=0.05 MIU/L-ACNC: 2.82 UIU/ML
VLDLC SERPL CALC-MCNC: 25 MG/DL
WBC # BLD: 10.8 10^3/ML

## 2022-01-13 DIAGNOSIS — E66.3 PATIENT OVERWEIGHT: ICD-10-CM

## 2022-01-13 DIAGNOSIS — I12.9 HYPERTENSIVE KIDNEY DISEASE WITH STAGE 3 CHRONIC KIDNEY DISEASE, UNSPECIFIED WHETHER STAGE 3A OR 3B CKD (HCC): ICD-10-CM

## 2022-01-13 DIAGNOSIS — E11.8 TYPE 2 DIABETES MELLITUS WITH COMPLICATION, WITHOUT LONG-TERM CURRENT USE OF INSULIN (HCC): ICD-10-CM

## 2022-01-13 DIAGNOSIS — N18.30 HYPERTENSIVE KIDNEY DISEASE WITH STAGE 3 CHRONIC KIDNEY DISEASE, UNSPECIFIED WHETHER STAGE 3A OR 3B CKD (HCC): ICD-10-CM

## 2022-01-13 DIAGNOSIS — F41.9 ANXIETY: ICD-10-CM

## 2022-01-13 DIAGNOSIS — N18.32 STAGE 3B CHRONIC KIDNEY DISEASE (HCC): ICD-10-CM

## 2022-01-13 DIAGNOSIS — I48.0 PAROXYSMAL ATRIAL FIBRILLATION (HCC): ICD-10-CM

## 2022-01-13 DIAGNOSIS — I25.10 CORONARY ARTERY DISEASE INVOLVING NATIVE CORONARY ARTERY OF NATIVE HEART WITHOUT ANGINA PECTORIS: ICD-10-CM

## 2022-01-13 LAB
ALBUMIN SERPL-MCNC: 4.3 G/DL (ref 3.4–5)
ANION GAP SERPL CALCULATED.3IONS-SCNC: 14 MMOL/L (ref 3–16)
BILIRUBIN URINE: NEGATIVE
BLOOD, URINE: NEGATIVE
BUN BLDV-MCNC: 27 MG/DL (ref 7–20)
CALCIUM SERPL-MCNC: 10.2 MG/DL (ref 8.3–10.6)
CHLORIDE BLD-SCNC: 102 MMOL/L (ref 99–110)
CLARITY: CLEAR
CO2: 22 MMOL/L (ref 21–32)
COLOR: YELLOW
CREAT SERPL-MCNC: 1.2 MG/DL (ref 0.6–1.2)
CREATININE URINE: 48.7 MG/DL (ref 28–259)
GFR AFRICAN AMERICAN: 54
GFR NON-AFRICAN AMERICAN: 44
GLUCOSE BLD-MCNC: 148 MG/DL (ref 70–99)
GLUCOSE URINE: NEGATIVE MG/DL
KETONES, URINE: NEGATIVE MG/DL
LEUKOCYTE ESTERASE, URINE: NEGATIVE
MAGNESIUM: 2.3 MG/DL (ref 1.8–2.4)
MICROSCOPIC EXAMINATION: NORMAL
NITRITE, URINE: NEGATIVE
PH UA: 6.5 (ref 5–8)
PHOSPHORUS: 3.6 MG/DL (ref 2.5–4.9)
POTASSIUM SERPL-SCNC: 4.6 MMOL/L (ref 3.5–5.1)
PROTEIN PROTEIN: 13 MG/DL
PROTEIN UA: NEGATIVE MG/DL
PROTEIN/CREAT RATIO: 0.3 MG/DL
SODIUM BLD-SCNC: 138 MMOL/L (ref 136–145)
SPECIFIC GRAVITY UA: 1.02 (ref 1–1.03)
URINE TYPE: NORMAL
UROBILINOGEN, URINE: 0.2 E.U./DL

## 2022-01-26 ENCOUNTER — OFFICE VISIT (OUTPATIENT)
Dept: CARDIOLOGY CLINIC | Age: 72
End: 2022-01-26
Payer: MEDICARE

## 2022-01-26 VITALS
BODY MASS INDEX: 45.99 KG/M2 | DIASTOLIC BLOOD PRESSURE: 72 MMHG | HEIGHT: 67 IN | SYSTOLIC BLOOD PRESSURE: 126 MMHG | HEART RATE: 60 BPM | WEIGHT: 293 LBS

## 2022-01-26 DIAGNOSIS — N18.30 HYPERTENSIVE KIDNEY DISEASE WITH STAGE 3 CHRONIC KIDNEY DISEASE, UNSPECIFIED WHETHER STAGE 3A OR 3B CKD (HCC): ICD-10-CM

## 2022-01-26 DIAGNOSIS — I12.9 HYPERTENSIVE KIDNEY DISEASE WITH STAGE 3 CHRONIC KIDNEY DISEASE, UNSPECIFIED WHETHER STAGE 3A OR 3B CKD (HCC): ICD-10-CM

## 2022-01-26 DIAGNOSIS — I25.10 CORONARY ARTERY DISEASE INVOLVING NATIVE CORONARY ARTERY OF NATIVE HEART WITHOUT ANGINA PECTORIS: Primary | ICD-10-CM

## 2022-01-26 DIAGNOSIS — E66.01 MORBID OBESITY (HCC): ICD-10-CM

## 2022-01-26 DIAGNOSIS — I10 PRIMARY HYPERTENSION: ICD-10-CM

## 2022-01-26 DIAGNOSIS — E11.8 TYPE 2 DIABETES MELLITUS WITH COMPLICATION, WITHOUT LONG-TERM CURRENT USE OF INSULIN (HCC): ICD-10-CM

## 2022-01-26 PROCEDURE — 99213 OFFICE O/P EST LOW 20 MIN: CPT | Performed by: INTERNAL MEDICINE

## 2022-01-26 NOTE — PROGRESS NOTES
OFFICE PROGRESS NOTES      Author Cj is a 70 y.o. female who has    CHIEF COMPLAINT AS FOLLOWS:  CHEST PAIN: Patient denies any C/O chest pains at this time. SOB:  Has SOB with exertion but no change over previous noted. LEG EDEMA: leg edema improved   PALPITATIONS: Denies any C/O Palpitations   DIZZINESS: No C/O Dizziness   SYNCOPE: None   OTHER/ ADDITIONAL COMPLAINTS:                                     HPI: Patient is here for F/U on her CAD, Arrhythmia, HTN & Dyslipidemia problems. CAD: Patient has known CAD. Had angioplasty in the past.  Arrhythmia: Patient has known Atrial Fibrillation. HTN: Patient has known essential HTN. Has been treated with guideline recommended medical / physical/ diet therapy as stated below. Dyslipidemia: Patient has known mixed dyslipidemia. Has been treated with guideline recommended medical / physical/ diet therapy as stated below.                 Current Outpatient Medications   Medication Sig Dispense Refill    rivaroxaban (XARELTO) 20 MG TABS tablet Take 20 mg by mouth      Omega-3 Fatty Acids (OMEGA 3 PO) Take by mouth      NONFORMULARY Take 1 capsule by mouth daily [facility]      losartan (COZAAR) 50 MG tablet Take 100 mg by mouth daily       fluticasone (FLONASE) 50 MCG/ACT nasal spray 1 spray by Nasal route as needed       glimepiride (AMARYL) 4 MG tablet Take 4 mg by mouth 2 times daily       NOVOLOG 100 UNIT/ML injection vial Inject 15 Units into the skin 4 times daily       vitamin D 1000 units CAPS Take 1 capsule by mouth daily      LORazepam (ATIVAN) 0.5 MG tablet Take 0.5 mg by mouth as needed       insulin detemir (LEVEMIR) 100 UNIT/ML injection vial Inject into the skin 2 times daily 45 units in the am and 50 units in the pm      aspirin 81 MG chewable tablet Take 1 tablet by mouth daily 30 tablet 3    carvedilol (COREG) 6.25 MG tablet Take 1 tablet by mouth 2 times daily (with meals) (Patient taking differently: Take 12.5 mg by mouth 2 times daily (with meals) ) 60 tablet 3    fenofibrate (TRICOR) 54 MG tablet Take 54 mg by mouth daily s [facility] pharmacy: Please dispense generic fenofibrate unless prescriber denote      levothyroxine (SYNTHROID) 25 MCG tablet Take 25 mcg by mouth Daily.  furosemide (LASIX) 40 MG tablet Take 40 mg by mouth as needed       cloNIDine (CATAPRES) 0.2 MG tablet Take 0.2 mg by mouth 2 times daily       amLODIPine (NORVASC) 10 MG tablet Take 10 mg by mouth daily  (Patient not taking: Reported on 1/26/2022)      simvastatin (ZOCOR) 20 MG tablet Take 1 tablet by mouth nightly (Patient not taking: Reported on 1/26/2022) 30 tablet 3     No current facility-administered medications for this visit. Allergies: Influenza vaccines, Pneumococcal vaccines, Codeine, Demerol, Eliquis [apixaban], Epinephrine, Humalog [insulin lispro], Iv dye [iodides], Metformin, Penicillins, Toujeo solostar [insulin glargine], Vicodin [hydrocodone-acetaminophen], Victoza [liraglutide], and Adhesive tape  Review of Systems:    Constitutional: Negative for diaphoresis and fatigue  Respiratory: Negative for shortness of breath  Cardiovascular: Negative for chest pain, dyspnea on exertion, claudication, edema, irregular heartbeat, murmur, palpitations or shortness of breath  Musculoskeletal: Negative for muscle pain, muscular weakness, negative for pain in arm and leg or swelling in foot and leg    Objective:  /72   Pulse 60   Ht 5' 7\" (1.702 m)   Wt (!) 318 lb 3.2 oz (144.3 kg)   BMI 49.84 kg/m²   Wt Readings from Last 3 Encounters:   01/26/22 (!) 318 lb 3.2 oz (144.3 kg)   11/02/21 (!) 319 lb 12.8 oz (145.1 kg)   10/23/21 (!) 321 lb (145.6 kg)     Body mass index is 49.84 kg/m². GENERAL - Alert, oriented, pleasant, in no apparent distress. EYES: No jaundice, no conjunctival pallor. Neck - Supple. No jugular venous distention noted. No carotid bruits.    Cardiovascular - Normal S1 and S2 without obvious murmur or gallop. Extremities - No cyanosis, clubbing, or significant edema. Pulmonary - No respiratory distress. No wheezes or rales.       MEDICAL DECISION MAKING & DATA REVIEW:    Lab Review   Lab Results   Component Value Date    TROPONINT <0.010 04/07/2016    TROPONINT <0.010 08/29/2015     Lab Results   Component Value Date    PROBNP 223.4 04/07/2016     Lab Results   Component Value Date    INR 0.91 06/29/2015     Lab Results   Component Value Date    LABA1C 8.4 (H) 07/01/2015    LABA1C 9.2 (H) 05/27/2011     Lab Results   Component Value Date    WBC 10.8 12/01/2021    WBC 12.0 (H) 11/19/2020    HCT 40.4 12/01/2021    HCT 41.2 11/19/2020    MCV 97.3 12/01/2021    MCV 99.4 11/19/2020     12/01/2021     11/19/2020     Lab Results   Component Value Date    CHOL 175 12/01/2021    CHOL 162 11/19/2020    TRIG 126 12/01/2021    TRIG 93 11/19/2020    HDL 42 12/01/2021    HDL 42 11/19/2020    LDLCALC 108 12/01/2021    LDLCALC 101 (H) 11/19/2020    LDLDIRECT 185 (H) 07/01/2015    LDLDIRECT 126 (H) 05/27/2011     Lab Results   Component Value Date    ALT 16 12/01/2021    ALT 13 11/19/2020    AST 19 12/01/2021    AST 14 (L) 11/19/2020     BMP:    Lab Results   Component Value Date     01/13/2022     12/01/2021    K 4.6 01/13/2022    K 4.6 12/01/2021     01/13/2022     12/01/2021    CO2 22 01/13/2022    CO2 27 12/01/2021    BUN 27 01/13/2022    BUN 25 12/01/2021    CREATININE 1.2 01/13/2022    CREATININE 1.4 12/01/2021     CMP:   Lab Results   Component Value Date     01/13/2022     12/01/2021    K 4.6 01/13/2022    K 4.6 12/01/2021     01/13/2022     12/01/2021    CO2 22 01/13/2022    CO2 27 12/01/2021    BUN 27 01/13/2022    BUN 25 12/01/2021    CREATININE 1.2 01/13/2022    CREATININE 1.4 12/01/2021    PROT 6.9 11/19/2020    PROT 7.7 04/07/2016    PROT 6.9 11/27/2012    PROT 6.4 05/27/2011     Lab Results   Component Value Date    TSH 2.820 12/01/2021    TSH 4.10 11/19/2020    [facility] 4.503 07/23/2013       QUALITY MEASURES REVIEWED:  1.CAD:Patient is taking anti platelet agent:Yes  2. DYSLIPIDEMIA: Patient is on cholesterol lowering medication:Yes   3. Beta-Blocker therapy for CAD, if prior Myocardial Infarction:Yes   4. Counselled regarding smoking cessation. No   Patient does not Smoke. 5.Anticoagulation therapy (for A.Fib) Yes   Does Not have A.Fib.  6.Discussed weight management strategies. Assessment & Plan:  Primary / Secondary prevention is the goal by aggressive risk modification, healthy and therapeutic life style changes for cardiovascular risk reduction. CORONARY ARTERY DISEASE:Yes  clinically stable. Patient is on optimal medical regimen ( see medication list above )  Patient is currently  asymptomatic from CAD. -changes in  treatment:   no. Patient is being treated with  Counseled regarding regular exercise & its benefits.  -Testing ordered:  no  [facility] classification: 1  Still waiting to see reports of previous stress test  HYPERTENSION:Yes  well controlled on current medical regimen.  - changes in  treatment:   no. Patient is on clonidine  Counseled regarding low salt diet, exercise & weight control. VALVULAR HEART DISEASE:no   No significant VHD noted  DYSLIPIDEMIA: yes,   Patient's profile is at / near Goal.no  HDL is low   Tolerating current medical regimen wellyes, . Takes Tricor  Does not tolerate medications well due to side effects  See most recent Lab values:( Reviewed Labs from family Dr. CONSTANCE     )  LDL is 108  HDL is 42    ARRHYTHMIAS:yes,   Patient has H/O Atrial fibrillation  She is rate controlled & on anticoagulation. Takes Xarelto  Patient is in NSR. MORBID OBESITY: Diet & Exercise. TESTS ORDERED: none this visit     PREVIOUSLY ORDERED TESTS REVIEWED & DISCUSSED WITH THE PATIENT:     I personally reviewed & interpreted, all previously ordered tests as copied above. Latest Labs are pulled in to the note with dates.    Labs, specially in Reference to Lipid profile, Cardiac testing in the form of Echo ( dated: ), stress tests ( dated: ) & other relevant cardiac testing reviewed with patient & recommendations made based on assessment of the results. Discussed role of Cardiac risk factors & effects + treatment of co morbidities with patient & advised accordingly. MEDICATIONS: List of medications patient is currently taking is reviewed in detail with the patient. Discussed any side effects or problems taking the medication. Recommend Continue present management & medications as listed. AFFIRMATION: I spent at least 20 minutes of time reviewing patient's history, previous & current medical problems & all Labs + testing. This includes chart prep even prior to the vosit. Various goals are discussed and multiple questions answered. Relevant concelling performed. Office follow up in six months.

## 2022-01-26 NOTE — PATIENT INSTRUCTIONS
CORONARY ARTERY DISEASE:Yes  clinically stable. Patient is on optimal medical regimen ( see medication list above )  Patient is currently  asymptomatic from CAD. -changes in  treatment:   no. Patient is being treated with  Counseled regarding regular exercise & its benefits.  -Testing ordered:  no  [facility] classification: 1  Still waiting to see reports of previous stress test  HYPERTENSION:Yes  well controlled on current medical regimen.  - changes in  treatment:   no. Patient is on clonidine  Counseled regarding low salt diet, exercise & weight control. VALVULAR HEART DISEASE:no   No significant VHD noted  DYSLIPIDEMIA: yes,   Patient's profile is at / near Goal.no  HDL is low   Tolerating current medical regimen wellyes, . Takes Tricor  Does not tolerate medications well due to side effects  See most recent Lab values:( Reviewed Labs from family Dr. CONSTANCE     )  LDL is 108  HDL is 42    ARRHYTHMIAS:yes,   Patient has H/O Atrial fibrillation  She is rate controlled & on anticoagulation. Takes Xarelto  Patient is in NSR. MORBID OBESITY: Diet & Exercise. TESTS ORDERED: none this visit     PREVIOUSLY ORDERED TESTS REVIEWED & DISCUSSED WITH THE PATIENT:     I personally reviewed & interpreted, all previously ordered tests as copied above. Latest Labs are pulled in to the note with dates. Labs, specially in Reference to Lipid profile, Cardiac testing in the form of Echo ( dated: ), stress tests ( dated: ) & other relevant cardiac testing reviewed with patient & recommendations made based on assessment of the results. Discussed role of Cardiac risk factors & effects + treatment of co morbidities with patient & advised accordingly. MEDICATIONS: List of medications patient is currently taking is reviewed in detail with the patient. Discussed any side effects or problems taking the medication. Recommend Continue present management & medications as listed.      AFFIRMATION: I spent at least 20 minutes of time reviewing patient's history, previous & current medical problems & all Labs + testing. This includes chart prep even prior to the vosit. Various goals are discussed and multiple questions answered. Relevant concelling performed. Office follow up in six months.

## 2022-01-26 NOTE — LETTER
[address]  [address] [address]  Phone: [phone]  Fax: [phone]    Ethel Fitzpatrick MD    January 26, 2022     Rhina Castaneda, APRN - [id number] [facility] [id number]    Patient: Monika Friend   MR Number: [id number]   YOB: 1950   Date of Visit: 1/26/2022       Dear Rhina Castaneda: Thank you for referring Mita Hughes to me for evaluation/treatment. Below are the relevant portions of my assessment and plan of care. If you have questions, please do not hesitate to call me. I look forward to following Benito Alejandro along with you.     Sincerely,      Ethel Fitzpatrick MD

## 2022-02-23 ENCOUNTER — TELEPHONE (OUTPATIENT)
Dept: CARDIOLOGY CLINIC | Age: 72
End: 2022-02-23

## 2022-02-23 NOTE — TELEPHONE ENCOUNTER
Pt called to see if we received records from [facility].  If not call pt, states will go get herself

## 2022-08-24 DIAGNOSIS — N18.32 STAGE 3B CHRONIC KIDNEY DISEASE (HCC): ICD-10-CM

## 2022-08-24 LAB
A/G RATIO: 1.7 (ref 1.1–2.2)
ALBUMIN SERPL-MCNC: 4 G/DL (ref 3.4–5)
ALP BLD-CCNC: 68 U/L (ref 40–129)
ALT SERPL-CCNC: 17 U/L (ref 10–40)
ANION GAP SERPL CALCULATED.3IONS-SCNC: 13 MMOL/L (ref 3–16)
AST SERPL-CCNC: 23 U/L (ref 15–37)
BASOPHILS ABSOLUTE: 0.1 K/UL (ref 0–0.2)
BASOPHILS RELATIVE PERCENT: 0.6 %
BILIRUB SERPL-MCNC: 0.3 MG/DL (ref 0–1)
BUN BLDV-MCNC: 18 MG/DL (ref 7–20)
CALCIUM SERPL-MCNC: 9.4 MG/DL (ref 8.3–10.6)
CHLORIDE BLD-SCNC: 103 MMOL/L (ref 99–110)
CO2: 22 MMOL/L (ref 21–32)
CREAT SERPL-MCNC: 1.3 MG/DL (ref 0.6–1.2)
CREATININE URINE: 66.8 MG/DL (ref 28–259)
EOSINOPHILS ABSOLUTE: 0.3 K/UL (ref 0–0.6)
EOSINOPHILS RELATIVE PERCENT: 2.8 %
GFR AFRICAN AMERICAN: 49
GFR NON-AFRICAN AMERICAN: 40
GLUCOSE BLD-MCNC: 163 MG/DL (ref 70–99)
HCT VFR BLD CALC: 40.1 % (ref 36–48)
HEMOGLOBIN: 13.4 G/DL (ref 12–16)
LYMPHOCYTES ABSOLUTE: 2.2 K/UL (ref 1–5.1)
LYMPHOCYTES RELATIVE PERCENT: 21.8 %
MAGNESIUM: 2.1 MG/DL (ref 1.8–2.4)
MCH RBC QN AUTO: 32.3 PG (ref 26–34)
MCHC RBC AUTO-ENTMCNC: 33.4 G/DL (ref 31–36)
MCV RBC AUTO: 96.9 FL (ref 80–100)
MONOCYTES ABSOLUTE: 0.8 K/UL (ref 0–1.3)
MONOCYTES RELATIVE PERCENT: 7.7 %
NEUTROPHILS ABSOLUTE: 6.8 K/UL (ref 1.7–7.7)
NEUTROPHILS RELATIVE PERCENT: 67.1 %
PDW BLD-RTO: 14.6 % (ref 12.4–15.4)
PHOSPHORUS: 3.7 MG/DL (ref 2.5–4.9)
PLATELET # BLD: 291 K/UL (ref 135–450)
PMV BLD AUTO: 8.8 FL (ref 5–10.5)
POTASSIUM SERPL-SCNC: 4.2 MMOL/L (ref 3.5–5.1)
PROTEIN PROTEIN: 12 MG/DL
PROTEIN/CREAT RATIO: 0.2 MG/DL
RBC # BLD: 4.14 M/UL (ref 4–5.2)
SODIUM BLD-SCNC: 138 MMOL/L (ref 136–145)
TOTAL PROTEIN: 6.3 G/DL (ref 6.4–8.2)
WBC # BLD: 10.1 K/UL (ref 4–11)

## 2022-09-06 ENCOUNTER — OFFICE VISIT (OUTPATIENT)
Dept: CARDIOLOGY CLINIC | Age: 72
End: 2022-09-06
Payer: MEDICARE

## 2022-09-06 VITALS
DIASTOLIC BLOOD PRESSURE: 76 MMHG | HEIGHT: 68 IN | HEART RATE: 60 BPM | WEIGHT: 293 LBS | BODY MASS INDEX: 44.41 KG/M2 | SYSTOLIC BLOOD PRESSURE: 130 MMHG

## 2022-09-06 DIAGNOSIS — I10 PRIMARY HYPERTENSION: ICD-10-CM

## 2022-09-06 DIAGNOSIS — E66.01 MORBID OBESITY (HCC): ICD-10-CM

## 2022-09-06 DIAGNOSIS — E11.8 TYPE 2 DIABETES MELLITUS WITH COMPLICATION, WITHOUT LONG-TERM CURRENT USE OF INSULIN (HCC): ICD-10-CM

## 2022-09-06 DIAGNOSIS — I48.0 PAROXYSMAL ATRIAL FIBRILLATION (HCC): ICD-10-CM

## 2022-09-06 DIAGNOSIS — I25.10 CORONARY ARTERY DISEASE INVOLVING NATIVE CORONARY ARTERY OF NATIVE HEART WITHOUT ANGINA PECTORIS: Primary | ICD-10-CM

## 2022-09-06 PROCEDURE — 99213 OFFICE O/P EST LOW 20 MIN: CPT | Performed by: INTERNAL MEDICINE

## 2022-09-06 PROCEDURE — 1123F ACP DISCUSS/DSCN MKR DOCD: CPT | Performed by: INTERNAL MEDICINE

## 2022-09-06 NOTE — LETTER
[address]  [address] [address]  Phone: [phone]  Fax: [phone]    Martha Sanchez MD    September 6, 2022     MONIQUE Allred - [address] [id number]  [facility]    Patient: Harshad Beverly   MR Number: [id number]   YOB: 1950   Date of Visit: 9/6/2022       Dear Marisa Gill: Thank you for referring Shannan Acuña to me for evaluation/treatment. Below are the relevant portions of my assessment and plan of care. If you have questions, please do not hesitate to call me. I look forward to following Kai Parisi along with you.     Sincerely,      Martha Sanchez MD

## 2022-09-06 NOTE — PROGRESS NOTES
[id number] Score for Atrial Fibrillation Stroke Risk   Risk   Factors  Component Value   C CHF No 0   H HTN Yes 1   A2 Age >= 76 No,  (75 y.o.) 0   D DM Yes 1   S2 Prior Stroke/TIA No 0   V Vascular Disease No 0   A Age 74-69 Yes,  (75 y.o.) 1   Sc Sex female 1    [id number]  Score  4   Score last updated 9/6/22 2:89 PM EDT    Click here for a link to the UpToDate guideline \"Atrial Fibrillation: Anticoagulation therapy to prevent embolization    Disclaimer: Risk Score calculation is dependent on accuracy of patient problem list and past encounter diagnosis.

## 2022-09-06 NOTE — PROGRESS NOTES
OFFICE PROGRESS NOTES      Maggie Sacks is a 70 y.o. female who has    CHIEF COMPLAINT AS FOLLOWS:  CHEST PAIN:   Patient denies any C/O chest pains at this time. SOB:  Has SOB with exertion but no change over previous noted. LEG EDEMA: leg edema improved   PALPITATIONS: Denies any C/O Palpitations   DIZZINESS: No C/O Dizziness                       SYNCOPE: None   OTHER/ ADDITIONAL COMPLAINTS:                                     HPI: Patient is here for F/U on her CAD, Arrhythmia, HTN & Dyslipidemia problems. CAD: Patient has known CAD. Had angioplasty in the past.  Arrhythmia: Patient has known  A-fib. HTN: Patient has known essential HTN. Has been treated with guideline recommended medical / physical/ diet therapy as stated below. Dyslipidemia: Patient has known mixed dyslipidemia. Has been treated with guideline recommended medical / physical/ diet therapy as stated below.                 Current Outpatient Medications   Medication Sig Dispense Refill    carvedilol (COREG) 12.5 MG tablet Take 12.5 mg by mouth 2 times daily (with meals)      Coenzyme Q10 (CO Q 10 PO) Take 400 mg by mouth daily      rivaroxaban (XARELTO) 20 MG TABS tablet Take 20 mg by mouth      fluticasone (FLONASE) 50 MCG/ACT nasal spray 1 spray by Nasal route as needed       NOVOLOG 100 UNIT/ML injection vial Inject 15 Units into the skin 4 times daily       vitamin D 1000 units CAPS Take 1 capsule by mouth every other day      LORazepam (ATIVAN) 0.5 MG tablet Take 0.5 mg by mouth as needed       insulin detemir (LEVEMIR) 100 UNIT/ML injection vial Inject into the skin 2 times daily 45 units in the am and 50 units in the pm      aspirin 81 MG chewable tablet Take 1 tablet by mouth daily 30 tablet 3    simvastatin (ZOCOR) 20 MG tablet Take 1 tablet by mouth nightly 30 tablet 3    fenofibrate (TRICOR) 54 MG tablet Take 54 mg by mouth daily s [facility]: Please dispense generic fenofibrate unless prescriber denote levothyroxine (SYNTHROID) 25 MCG tablet Take 25 mcg by mouth Daily. furosemide (LASIX) 40 MG tablet Take 40 mg by mouth once a week      cloNIDine (CATAPRES) 0.2 MG tablet Take 0.2 mg by mouth 2 times daily        No current facility-administered medications for this visit. Allergies: Influenza vaccines, Pneumococcal vaccines, Codeine, Demerol, Eliquis [apixaban], Epinephrine, Humalog [insulin lispro], Iv dye [iodides], Metformin, Penicillins, Toujeo solostar [insulin glargine], Vicodin [hydrocodone-acetaminophen], Victoza [liraglutide], and Adhesive tape  Review of Systems:    Constitutional: Negative for diaphoresis and fatigue  Respiratory: Negative for shortness of breath  Cardiovascular: Negative for chest pain, dyspnea on exertion, claudication, edema, irregular heartbeat, murmur, palpitations or shortness of breath  Musculoskeletal: Negative for muscle pain, muscular weakness, negative for pain in arm and leg or swelling in foot and leg    Objective:  /76   Pulse 60   Ht 5' 7.5\" (1.715 m)   Wt (!) 312 lb (141.5 kg)   BMI 48.14 kg/m²   Wt Readings from Last 3 Encounters:   09/06/22 (!) 312 lb (141.5 kg)   08/31/22 (!) 311 lb 9.6 oz (141.3 kg)   01/26/22 (!) 318 lb 3.2 oz (144.3 kg)     Body mass index is 48.14 kg/m². GENERAL - Alert, oriented, pleasant, in no apparent distress. EYES: No jaundice, no conjunctival pallor. Neck - Supple. No jugular venous distention noted. No carotid bruits. Cardiovascular - Normal S1 and S2 without obvious murmur or gallop. Extremities - No cyanosis, clubbing, or significant edema. Pulmonary - No respiratory distress. No wheezes or rales.       MEDICAL DECISION MAKING & DATA REVIEW:    Lab Review   Lab Results   Component Value Date/Time    TROPONINT <0.010 04/07/2016 02:50 PM    TROPONINT <0.010 08/29/2015 08:15 AM     Lab Results   Component Value Date/Time    PROBNP 223.4 04/07/2016 03:40 PM     Lab Results   Component Value Date    INR 0.91 06/29/2015     Lab Results   Component Value Date    LABA1C 8.4 (H) 07/01/2015    LABA1C 9.2 (H) 05/27/2011     Lab Results   Component Value Date    WBC 10.1 08/24/2022    WBC 10.8 12/01/2021    HCT 40.1 08/24/2022    HCT 40.4 12/01/2021    MCV 96.9 08/24/2022    MCV 97.3 12/01/2021     08/24/2022     12/01/2021     Lab Results   Component Value Date    CHOL 175 12/01/2021    CHOL 162 11/19/2020    TRIG 126 12/01/2021    TRIG 93 11/19/2020    HDL 42 12/01/2021    HDL 42 11/19/2020    LDLCALC 108 12/01/2021    LDLCALC 101 (H) 11/19/2020    LDLDIRECT 185 (H) 07/01/2015    LDLDIRECT 126 (H) 05/27/2011     Lab Results   Component Value Date    ALT 17 08/24/2022    ALT 16 12/01/2021    AST 23 08/24/2022    AST 19 12/01/2021     BMP:    Lab Results   Component Value Date/Time     08/24/2022 10:02 AM     01/13/2022 11:06 AM    K 4.2 08/24/2022 10:02 AM    K 4.6 01/13/2022 11:06 AM     08/24/2022 10:02 AM     01/13/2022 11:06 AM    CO2 22 08/24/2022 10:02 AM    CO2 22 01/13/2022 11:06 AM    BUN 18 08/24/2022 10:02 AM    BUN 27 01/13/2022 11:06 AM    CREATININE 1.3 08/24/2022 10:02 AM    CREATININE 1.2 01/13/2022 11:06 AM     CMP:   Lab Results   Component Value Date/Time     08/24/2022 10:02 AM     01/13/2022 11:06 AM    K 4.2 08/24/2022 10:02 AM    K 4.6 01/13/2022 11:06 AM     08/24/2022 10:02 AM     01/13/2022 11:06 AM    CO2 22 08/24/2022 10:02 AM    CO2 22 01/13/2022 11:06 AM    BUN 18 08/24/2022 10:02 AM    BUN 27 01/13/2022 11:06 AM    CREATININE 1.3 08/24/2022 10:02 AM    CREATININE 1.2 01/13/2022 11:06 AM    PROT 6.3 08/24/2022 10:02 AM    PROT 6.9 11/19/2020 09:55 AM    PROT 6.9 11/27/2012 10:33 AM    PROT 6.4 05/27/2011 08:54 AM     Lab Results   Component Value Date/Time    TSH 2.820 12/01/2021 12:00 AM    TSH 4.10 11/19/2020 09:55 AM    [facility] 4.503 07/23/2013 10:14 AM       QUALITY MEASURES REVIEWED:  1.CAD:Patient is taking anti platelet agent: Yes  2. DYSLIPIDEMIA: Patient is on cholesterol lowering medication:Yes   3. Beta-Blocker therapy for CAD, if prior Myocardial Infarction:Yes   4. Counselled regarding smoking cessation. No   Patient does not Smoke. 5.Anticoagulation therapy (for A.Fib) Yes   Does Not have A.Fib.  6.Discussed weight management strategies. Assessment & Plan:  Primary / Secondary prevention is the goal by aggressive risk modification, healthy and therapeutic life style changes for cardiovascular risk reduction. CORONARY ARTERY DISEASE:Yes  clinically stable. Patient is on optimal medical regimen ( see medication list above )  Patient is currently  asymptomatic from CAD. -changes in  treatment:   no. Patient is being treated with ASA & Coreg  Counseled regarding regular exercise & its benefits.  -Testing ordered:  no  [facility] classification: 1  4/2021 reports from [facility] Cardiology suggests, mild to moderate inferior & lateral wall Ischemia. LV function was normal.  - changes in  treatment:   no. Patient is on clonidine  Counseled regarding low salt diet, exercise & weight control. VALVULAR HEART DISEASE:    No significant VHD noted  9/2017 ECHO at [facility] Cardiology: Mild MR & Mild to Moderate AI  DYSLIPIDEMIA: yes,   Patient's profile is at / near Goal.no  HDL is low   Tolerating current medical regimen wellyes, . Takes Tricor & Zocor  Does not tolerate medications well due to side effects  See most recent Lab values:( Reviewed Labs from family Dr. CONSTANCE     )  LDL is 108  HDL is 42     ARRHYTHMIAS:yes,   Patient has H/O Atrial fibrillation  She is rate controlled & on anticoagulation. Takes Xarelto  Patient is in NSR. MORBID OBESITY: Diet & Exercise. TESTS ORDERED: Echo     PREVIOUSLY ORDERED TESTS REVIEWED & DISCUSSED WITH THE PATIENT:     I personally reviewed & interpreted, all previously ordered tests as copied above. Latest Labs are pulled in to the note with dates.    Labs, specially in Reference to Lipid profile, Cardiac testing in the form of Echo ( dated: ), stress tests ( dated: ) & other relevant cardiac testing reviewed with patient & recommendations made based on assessment of the results. Discussed role of Cardiac risk factors & effects + treatment of co morbidities with patient & advised accordingly. MEDICATIONS: List of medications patient is currently taking is reviewed in detail with the patient & family member present. Discussed any side effects or problems taking the medication. Recommend Continue present management & medications as listed. AFFIRMATION: I spent at least 20 minutes of time reviewing patient's history, previous & current medical problems & all Labs + testing. This includes chart prep even prior to the vosit. Various goals are discussed and multiple questions answered. Relevant concelling performed. Office follow up in six months.

## 2022-09-14 ENCOUNTER — PROCEDURE VISIT (OUTPATIENT)
Dept: CARDIOLOGY CLINIC | Age: 72
End: 2022-09-14
Payer: MEDICARE

## 2022-09-14 DIAGNOSIS — I48.0 PAROXYSMAL ATRIAL FIBRILLATION (HCC): ICD-10-CM

## 2022-09-14 DIAGNOSIS — I10 PRIMARY HYPERTENSION: ICD-10-CM

## 2022-09-14 DIAGNOSIS — I25.10 CORONARY ARTERY DISEASE INVOLVING NATIVE CORONARY ARTERY OF NATIVE HEART WITHOUT ANGINA PECTORIS: ICD-10-CM

## 2022-09-14 DIAGNOSIS — E66.01 MORBID OBESITY (HCC): ICD-10-CM

## 2022-09-14 DIAGNOSIS — E11.8 TYPE 2 DIABETES MELLITUS WITH COMPLICATION, WITHOUT LONG-TERM CURRENT USE OF INSULIN (HCC): ICD-10-CM

## 2022-09-14 LAB
LV EF: 58 %
LVEF MODALITY: NORMAL

## 2022-09-14 PROCEDURE — 93306 TTE W/DOPPLER COMPLETE: CPT | Performed by: INTERNAL MEDICINE

## 2022-10-05 ENCOUNTER — TELEPHONE (OUTPATIENT)
Dept: CARDIOLOGY CLINIC | Age: 72
End: 2022-10-05

## 2022-10-05 NOTE — TELEPHONE ENCOUNTER
Echo  Limited study due to patients body habitus. Left ventricular function and size is normal, EF is estimated at 55-60%. Mild left ventricular hypertrophy. E/A reversal; indeterminate diastolic   function. Moderately dilated left atrium. Sclerotic, but non-stenotic aortic valve. Mitral annular calcification is present. Mild mitral, pulmonic and tricuspid regurgitation is present. RVSP is 32 mmHg. No evidence of pericardial effusion.      Left message

## 2023-03-21 ENCOUNTER — OFFICE VISIT (OUTPATIENT)
Dept: CARDIOLOGY CLINIC | Age: 73
End: 2023-03-21
Payer: MEDICARE

## 2023-03-21 VITALS
SYSTOLIC BLOOD PRESSURE: 148 MMHG | HEART RATE: 62 BPM | WEIGHT: 293 LBS | DIASTOLIC BLOOD PRESSURE: 64 MMHG | BODY MASS INDEX: 45.99 KG/M2 | HEIGHT: 67 IN

## 2023-03-21 DIAGNOSIS — E11.8 TYPE 2 DIABETES MELLITUS WITH COMPLICATION, WITHOUT LONG-TERM CURRENT USE OF INSULIN (HCC): ICD-10-CM

## 2023-03-21 DIAGNOSIS — I48.0 PAROXYSMAL ATRIAL FIBRILLATION (HCC): ICD-10-CM

## 2023-03-21 DIAGNOSIS — I25.10 CORONARY ARTERY DISEASE INVOLVING NATIVE CORONARY ARTERY OF NATIVE HEART WITHOUT ANGINA PECTORIS: Primary | ICD-10-CM

## 2023-03-21 DIAGNOSIS — E66.01 MORBID OBESITY (HCC): ICD-10-CM

## 2023-03-21 DIAGNOSIS — I10 PRIMARY HYPERTENSION: ICD-10-CM

## 2023-03-21 PROCEDURE — 3078F DIAST BP <80 MM HG: CPT | Performed by: INTERNAL MEDICINE

## 2023-03-21 PROCEDURE — 1123F ACP DISCUSS/DSCN MKR DOCD: CPT | Performed by: INTERNAL MEDICINE

## 2023-03-21 PROCEDURE — 3077F SYST BP >= 140 MM HG: CPT | Performed by: INTERNAL MEDICINE

## 2023-03-21 PROCEDURE — 99213 OFFICE O/P EST LOW 20 MIN: CPT | Performed by: INTERNAL MEDICINE

## 2023-03-21 NOTE — PROGRESS NOTES
most recent Lab values:( Reviewed Labs from family Dr. CONSTANCE     )  LDL is 108  HDL is 42     ARRHYTHMIAS:yes,   Patient has H/O Atrial fibrillation  She is rate controlled & on anticoagulation. Takes Xarelto  Patient is in NSR. MORBID OBESITY: Diet & Exercise. TESTS ORDERED: None this visit     PREVIOUSLY ORDERED TESTS REVIEWED & DISCUSSED WITH THE PATIENT:     I personally reviewed & interpreted, all previously ordered tests as copied above. Latest Labs are pulled in to the note with dates. Labs, specially in Reference to Lipid profile, Cardiac testing in the form of Echo ( dated: ), stress tests ( dated: ) & other relevant cardiac testing reviewed with patient & recommendations made based on assessment of the results. Discussed role of Cardiac risk factors & effects + treatment of co morbidities with patient & advised accordingly. MEDICATIONS: List of medications patient is currently taking is reviewed in detail with the patient. Discussed any side effects or problems taking the medication. Recommend Continue present management & medications as listed. AFFIRMATION: I  spent about 20 minutes reviewing patient's history, previous & current medical problems & all Labs + testing. This includes chart prep even prior to the vosit. Various goals are discussed and multiple questions answered. Relevant concelling performed. Office follow up in six months.

## 2023-03-21 NOTE — PATIENT INSTRUCTIONS
CORONARY ARTERY DISEASE:Yes  clinically stable. Patient is on optimal medical regimen ( see medication list above )  Patient is currently  asymptomatic from CAD. -changes in  treatment:   no. Patient is being treated with ASA & Coreg  Counseled regarding regular exercise & its benefits.  -Testing ordered:  no  [place] classification: 1  4/2021 reports from [facility] suggests, mild to moderate inferior & lateral wall Ischemia. LV function was normal.  - changes in  treatment:   no. Patient is on clonidine  Counseled regarding low salt diet, exercise & weight control. HYPERTENSION: ? NOT WELL CONTROLLED. Says BP is always good at home. VALVULAR HEART DISEASE:    No significant VHD noted  9/2022 ECHO:   Limited study due to patients body habitus. Left ventricular function and size is normal, EF is estimated at 55-60%. Mild left ventricular hypertrophy. E/A reversal; indeterminate diastolic   function. Moderately dilated left atrium. Sclerotic, but non-stenotic aortic valve. Mitral annular calcification is present. Mild mitral, pulmonic and tricuspid regurgitation is present. RVSP is 32 mmHg. No evidence of pericardial effusion. DYSLIPIDEMIA: yes,   Patient's profile is at / near Goal.no  HDL is low   Tolerating current medical regimen wellyes, . Takes Tricor & Zocor  Does not tolerate medications well due to side effects  See most recent Lab values:( Reviewed Labs from family Dr. CONSTANCE     )  LDL is 108  HDL is 42     ARRHYTHMIAS:yes,   Patient has H/O Atrial fibrillation  She is rate controlled & on anticoagulation. Takes Xarelto  Patient is in NSR. MORBID OBESITY: Diet & Exercise. TESTS ORDERED: None this visit     PREVIOUSLY ORDERED TESTS REVIEWED & DISCUSSED WITH THE PATIENT:     I personally reviewed & interpreted, all previously ordered tests as copied above. Latest Labs are pulled in to the note with dates.    Labs, specially in Reference to Lipid profile,

## 2023-03-21 NOTE — LETTER
March 21, 2023      Alex Leggett, APRN - [id number] [facility] [id number]  [facility]      Patient: Aislinn Augustine   MR Number: [id number]   YOB: 1950   Date of Visit: 3/21/2023       Dear Alex Leggett: Thank you for referring Saeid Rees to me for evaluation/treatment. Below are the relevant portions of my assessment and plan of care. If you have questions, please do not hesitate to call me. I look forward to following [place] along with you.     Sincerely,        Hilario Bo MD

## 2023-07-18 NOTE — PATIENT INSTRUCTIONS
CORONARY ARTERY DISEASE:Yes  clinically stable. Patient is on optimal medical regimen ( see medication list above )  Patient is currently  asymptomatic from CAD. -changes in  treatment:   no. Patient is being treated with ASA & Coreg  Counseled regarding regular exercise & its benefits.  -Testing ordered:  no  [place] classification: 1  4/2021 reports from [facility] suggests, mild to moderate inferior & lateral wall Ischemia. LV function was normal.  - changes in  treatment:   no. Patient is on clonidine  Counseled regarding low salt diet, exercise & weight control. VALVULAR HEART DISEASE:    No significant VHD noted  9/2017 ECHO at [facility]: Mild MR & Mild to Moderate AI  DYSLIPIDEMIA: yes,   Patient's profile is at / near Goal.no  HDL is low   Tolerating current medical regimen wellyes, . Takes Tricor & Zocor  Does not tolerate medications well due to side effects  See most recent Lab values:( Reviewed Labs from family MD     )  LDL is 108  HDL is 42     ARRHYTHMIAS:yes,   Patient has H/O Atrial fibrillation  She is rate controlled & on anticoagulation. Takes Xarelto  Patient is in NSR. MORBID OBESITY: Diet & Exercise. TESTS ORDERED: Echo     PREVIOUSLY ORDERED TESTS REVIEWED & DISCUSSED WITH THE PATIENT:     I personally reviewed & interpreted, all previously ordered tests as copied above. Latest Labs are pulled in to the note with dates. Labs, specially in Reference to Lipid profile, Cardiac testing in the form of Echo ( dated: ), stress tests ( dated: ) & other relevant cardiac testing reviewed with patient & recommendations made based on assessment of the results. Discussed role of Cardiac risk factors & effects + treatment of co morbidities with patient & advised accordingly. MEDICATIONS: List of medications patient is currently taking is reviewed in detail with the patient & family member present.  Discussed any side effects or problems taking the The patient's assessment was reviewed with Dr. [place] and a collaborative plan of care was established. medication. Recommend Continue present management & medications as listed. AFFIRMATION: I spent at least 20 minutes of time reviewing patient's history, previous & current medical problems & all Labs + testing. This includes chart prep even prior to the vosit. Various goals are discussed and multiple questions answered. Relevant concelling performed. Office follow up in six months.

## 2023-09-21 ENCOUNTER — OFFICE VISIT (OUTPATIENT)
Dept: CARDIOLOGY CLINIC | Age: 73
End: 2023-09-21
Payer: MEDICARE

## 2023-09-21 VITALS
DIASTOLIC BLOOD PRESSURE: 84 MMHG | BODY MASS INDEX: 44.41 KG/M2 | HEART RATE: 60 BPM | WEIGHT: 293 LBS | SYSTOLIC BLOOD PRESSURE: 152 MMHG | HEIGHT: 68 IN

## 2023-09-21 DIAGNOSIS — I25.10 CORONARY ARTERY DISEASE INVOLVING NATIVE CORONARY ARTERY OF NATIVE HEART WITHOUT ANGINA PECTORIS: Primary | ICD-10-CM

## 2023-09-21 DIAGNOSIS — E11.8 TYPE 2 DIABETES MELLITUS WITH COMPLICATION, WITHOUT LONG-TERM CURRENT USE OF INSULIN (HCC): ICD-10-CM

## 2023-09-21 DIAGNOSIS — E66.01 MORBID OBESITY (HCC): ICD-10-CM

## 2023-09-21 DIAGNOSIS — I48.0 PAROXYSMAL ATRIAL FIBRILLATION (HCC): ICD-10-CM

## 2023-09-21 DIAGNOSIS — I10 PRIMARY HYPERTENSION: ICD-10-CM

## 2023-09-21 PROCEDURE — 3077F SYST BP >= 140 MM HG: CPT | Performed by: INTERNAL MEDICINE

## 2023-09-21 PROCEDURE — 3079F DIAST BP 80-89 MM HG: CPT | Performed by: INTERNAL MEDICINE

## 2023-09-21 PROCEDURE — 1123F ACP DISCUSS/DSCN MKR DOCD: CPT | Performed by: INTERNAL MEDICINE

## 2023-09-21 PROCEDURE — 99213 OFFICE O/P EST LOW 20 MIN: CPT | Performed by: INTERNAL MEDICINE

## 2023-09-21 PROCEDURE — 93000 ELECTROCARDIOGRAM COMPLETE: CPT | Performed by: INTERNAL MEDICINE

## 2023-09-21 NOTE — PATIENT INSTRUCTIONS
CORONARY ARTERY DISEASE:Yes. Patient does not think her chest pain is her heart. Says it is all stress. clinically stable. Patient is on optimal medical regimen ( see medication list above )  Patient is currently  /symptomatic from CAD. -changes in  treatment:   no. Patient is being treated with ASA & Coreg  Counseled regarding regular exercise & its benefits.  -Testing ordered:  no  [place] classification: 1  4/2021 reports from [place] [facility] suggests, mild to moderate inferior & lateral wall Ischemia. LV function was normal.  - changes in  treatment:   no. Patient is on clonidine  Counseled regarding low salt diet, exercise & weight control. EKG: NSR 60/min, WNL     HYPERTENSION: ? NOT WELL CONTROLLED. Says BP is always good at home. VALVULAR HEART DISEASE:    No significant VHD noted  9/2022 ECHO:   Limited study due to patients body habitus. Left ventricular function and size is normal, EF is estimated at 55-60%. Mild left ventricular hypertrophy. E/A reversal; indeterminate diastolic   function. Moderately dilated left atrium. Sclerotic, but non-stenotic aortic valve. Mitral annular calcification is present. Mild mitral, pulmonic and tricuspid regurgitation is present. RVSP is 32 mmHg. No evidence of pericardial effusion. DYSLIPIDEMIA: yes,   Patient's profile is at / near Goal.no  HDL is low   Tolerating current medical regimen wellyes, . Takes Tricor & Zocor  Does not tolerate medications well due to side effects  See most recent Lab values:( Reviewed Labs from family Dr. CONSTANCE     )  LDL is 108  HDL is 42     ARRHYTHMIAS:yes,   Patient has H/O Atrial fibrillation  She is rate controlled & on anticoagulation. Takes Xarelto  Patient is in NSR. MORBID OBESITY: Diet & Exercise. TESTS ORDERED: None this visit      PREVIOUSLY ORDERED TESTS REVIEWED & DISCUSSED WITH THE PATIENT:     I personally reviewed & interpreted, all previously ordered tests as copied above.

## 2023-09-21 NOTE — PROGRESS NOTES
Vein \"LEG PROBLEM Questionnaire\"  Do you have prominent leg veins? No   Do you have any skin discoloration? No  Do you have any healed or active sores? No  Do you have swelling of the legs? Yes  Do you have a family history of varicose veins? No  Does your profession involve pro-longed        standing or heavy lifting? No  7. Have you been fighting overweight problems? Yes  8. Do you have restless legs? No  9. Do you have any night time cramps? No  10. Do you have any of the following in your legs:         no  I  11. If Yes - Have they worn compression stockings Yes  12.  If they have worn compression stockings 3 Years (on and off)
yes,   Patient's profile is at / near Goal.no  HDL is low   Tolerating current medical regimen wellyes, . Takes Tricor & Zocor  Does not tolerate medications well due to side effects  See most recent Lab values:( Reviewed Labs from family MD,      )  LDL is 108  HDL is 42     ARRHYTHMIAS:yes,   Patient has H/O Atrial fibrillation  She is rate controlled & on anticoagulation. Takes Xarelto  Patient is in NSR. MORBID OBESITY: Diet & Exercise. TESTS ORDERED: None this visit      PREVIOUSLY ORDERED TESTS REVIEWED & DISCUSSED WITH THE PATIENT:     I personally reviewed & interpreted, all previously ordered tests as copied above. Latest Labs are pulled in to the note with dates. Labs, specially in Reference to Lipid profile, Cardiac testing in the form of Echo ( dated: ), stress tests ( dated: ) & other relevant cardiac testing reviewed with patient & recommendations made based on assessment of the results. Discussed role of Cardiac risk factors & effects + treatment of co morbidities with patient & advised accordingly. MEDICATIONS: List of medications patient is currently taking is reviewed in detail with the patient. Discussed any side effects or problems taking the medication. Recommend Continue present management & medications as listed. AFFIRMATION: I spent at least 20 minutes of time reviewing patient's history, previous & current medical problems & all Labs + testing. This includes chart prep even prior to the vosit. Various goals are discussed and multiple questions answered. Relevant concelling performed. Office follow up in six months.

## 2023-10-04 ENCOUNTER — HOSPITAL ENCOUNTER (OUTPATIENT)
Age: 73
Discharge: HOME | End: 2023-10-04
Payer: MEDICARE

## 2023-10-04 DIAGNOSIS — E66.01 MORBID OBESITY (HCC): ICD-10-CM

## 2023-10-04 DIAGNOSIS — E11.8 TYPE 2 DIABETES MELLITUS WITH COMPLICATION, WITHOUT LONG-TERM CURRENT USE OF INSULIN (HCC): ICD-10-CM

## 2023-10-04 DIAGNOSIS — I25.10 CORONARY ARTERY DISEASE INVOLVING NATIVE CORONARY ARTERY OF NATIVE HEART WITHOUT ANGINA PECTORIS: ICD-10-CM

## 2023-10-04 DIAGNOSIS — I12.9 HYPERTENSIVE KIDNEY DISEASE WITH STAGE 3A CHRONIC KIDNEY DISEASE (HCC): ICD-10-CM

## 2023-10-04 DIAGNOSIS — I48.0 PAROXYSMAL ATRIAL FIBRILLATION (HCC): ICD-10-CM

## 2023-10-04 DIAGNOSIS — N18.31 STAGE 3A CHRONIC KIDNEY DISEASE (HCC): ICD-10-CM

## 2023-10-04 DIAGNOSIS — N18.31 HYPERTENSIVE KIDNEY DISEASE WITH STAGE 3A CHRONIC KIDNEY DISEASE (HCC): ICD-10-CM

## 2023-10-04 LAB
ALBUMIN SERPL-MCNC: 4.1 GM/DL (ref 3.4–5)
ANION GAP SERPL CALCULATED.3IONS-SCNC: 10 MMOL/L (ref 4–16)
BILIRUBIN URINE: NEGATIVE MG/DL
BLOOD, URINE: NEGATIVE
BUN SERPL-MCNC: 20 MG/DL (ref 6–23)
CALCIUM SERPL-MCNC: 9.4 MG/DL (ref 8.3–10.6)
CHLORIDE BLD-SCNC: 102 MMOL/L (ref 99–110)
CLARITY: CLEAR
CO2: 24 MMOL/L (ref 21–32)
COLOR: YELLOW
COMMENT UA: NORMAL
CREAT SERPL-MCNC: 0.9 MG/DL (ref 0.6–1.1)
CREATININE URINE: 45.3 MG/DL (ref 28–217)
GFR SERPL CREATININE-BSD FRML MDRD: >60 ML/MIN/1.73M2
GLUCOSE SERPL-MCNC: 259 MG/DL (ref 70–99)
GLUCOSE, URINE: NEGATIVE MG/DL
KETONES, URINE: NEGATIVE MG/DL
LEUKOCYTE ESTERASE, URINE: NEGATIVE
MAGNESIUM: 2.1 MG/DL (ref 1.8–2.4)
NITRITE URINE, QUANTITATIVE: NEGATIVE
PH, URINE: 5.5 (ref 5–8)
PHOSPHORUS: 3.5 MG/DL (ref 2.5–4.9)
POTASSIUM SERPL-SCNC: 4.5 MMOL/L (ref 3.5–5.1)
PROT/CREAT RATIO, UR: 0.3
PROTEIN UA: NEGATIVE MG/DL
SODIUM BLD-SCNC: 136 MMOL/L (ref 135–145)
SPECIFIC GRAVITY UA: 1.01 (ref 1–1.03)
URINE TOTAL PROTEIN: 14.2 MG/DL
UROBILINOGEN, URINE: 0.2 MG/DL (ref 0.2–1)

## 2023-10-04 PROCEDURE — 80048 BASIC METABOLIC PNL TOTAL CA: CPT

## 2023-10-04 PROCEDURE — 81003 URINALYSIS AUTO W/O SCOPE: CPT

## 2023-10-04 PROCEDURE — 84156 ASSAY OF PROTEIN URINE: CPT

## 2023-10-04 PROCEDURE — 36415 COLL VENOUS BLD VENIPUNCTURE: CPT

## 2023-10-04 PROCEDURE — 84100 ASSAY OF PHOSPHORUS: CPT

## 2023-10-04 PROCEDURE — 82040 ASSAY OF SERUM ALBUMIN: CPT

## 2023-10-04 PROCEDURE — 82570 ASSAY OF URINE CREATININE: CPT

## 2023-10-04 PROCEDURE — 83735 ASSAY OF MAGNESIUM: CPT

## 2023-10-11 PROBLEM — N18.2 CHRONIC KIDNEY DISEASE, STAGE II (MILD): Status: ACTIVE | Noted: 2023-10-11

## 2024-03-03 ENCOUNTER — APPOINTMENT (OUTPATIENT)
Dept: GENERAL RADIOLOGY | Age: 74
End: 2024-03-03
Attending: STUDENT IN AN ORGANIZED HEALTH CARE EDUCATION/TRAINING PROGRAM
Payer: MEDICARE

## 2024-03-03 ENCOUNTER — HOSPITAL ENCOUNTER (EMERGENCY)
Age: 74
Discharge: HOME | End: 2024-03-03
Attending: STUDENT IN AN ORGANIZED HEALTH CARE EDUCATION/TRAINING PROGRAM
Payer: MEDICARE

## 2024-03-03 VITALS
DIASTOLIC BLOOD PRESSURE: 74 MMHG | RESPIRATION RATE: 22 BRPM | SYSTOLIC BLOOD PRESSURE: 127 MMHG | BODY MASS INDEX: 45.06 KG/M2 | TEMPERATURE: 98.8 F | WEIGHT: 292 LBS | OXYGEN SATURATION: 97 % | HEART RATE: 85 BPM

## 2024-03-03 DIAGNOSIS — I48.91 ATRIAL FIBRILLATION, UNSPECIFIED TYPE (HCC): Primary | ICD-10-CM

## 2024-03-03 LAB
ALCOHOL SCREEN SERUM: <0.01 %WT/VOL
AMPHETAMINES: NEGATIVE
ANION GAP SERPL CALCULATED.3IONS-SCNC: 13 MMOL/L (ref 7–16)
BARBITURATE SCREEN URINE: NEGATIVE
BASOPHILS ABSOLUTE: 0.1 K/CU MM
BASOPHILS RELATIVE PERCENT: 0.4 % (ref 0–1)
BENZODIAZEPINE SCREEN, URINE: NEGATIVE
BUN SERPL-MCNC: 26 MG/DL (ref 6–23)
CALCIUM SERPL-MCNC: 9.3 MG/DL (ref 8.3–10.6)
CANNABINOID SCREEN URINE: NEGATIVE
CHLORIDE BLD-SCNC: 98 MMOL/L (ref 99–110)
CO2: 25 MMOL/L (ref 21–32)
COCAINE METABOLITE: NEGATIVE
CREAT SERPL-MCNC: 1.1 MG/DL (ref 0.6–1.1)
DIFFERENTIAL TYPE: ABNORMAL
EOSINOPHILS ABSOLUTE: 0.2 K/CU MM
EOSINOPHILS RELATIVE PERCENT: 1.9 % (ref 0–3)
FENTANYL URINE: NEGATIVE
GFR SERPL CREATININE-BSD FRML MDRD: 53 ML/MIN/1.73M2
GLUCOSE SERPL-MCNC: 228 MG/DL (ref 70–99)
HCT VFR BLD CALC: 46.5 % (ref 37–47)
HEMOGLOBIN: 14.8 GM/DL (ref 12.5–16)
IMMATURE NEUTROPHIL %: 0.5 % (ref 0–0.43)
INFLUENZA A ANTIGEN: NOT DETECTED
INFLUENZA B ANTIGEN: NOT DETECTED
LYMPHOCYTES ABSOLUTE: 2.9 K/CU MM
LYMPHOCYTES RELATIVE PERCENT: 23.7 % (ref 24–44)
MCH RBC QN AUTO: 31.2 PG (ref 27–31)
MCHC RBC AUTO-ENTMCNC: 31.8 % (ref 32–36)
MCV RBC AUTO: 97.9 FL (ref 78–100)
MONOCYTES ABSOLUTE: 1 K/CU MM
MONOCYTES RELATIVE PERCENT: 7.8 % (ref 0–4)
NUCLEATED RBC %: 0 %
OPIATES, URINE: NEGATIVE
OXYCODONE: NEGATIVE
PDW BLD-RTO: 13.9 % (ref 11.7–14.9)
PLATELET # BLD: 319 K/CU MM (ref 140–440)
PMV BLD AUTO: 10.1 FL (ref 7.5–11.1)
POTASSIUM SERPL-SCNC: 4 MMOL/L (ref 3.5–5.1)
PRO-BNP: 1330 PG/ML
PROCALCITONIN SERPL-MCNC: 0.05 NG/ML
RBC # BLD: 4.75 M/CU MM (ref 4.2–5.4)
SARS-COV-2 RDRP RESP QL NAA+PROBE: NOT DETECTED
SEGMENTED NEUTROPHILS ABSOLUTE COUNT: 8 K/CU MM
SEGMENTED NEUTROPHILS RELATIVE PERCENT: 65.7 % (ref 36–66)
SODIUM BLD-SCNC: 136 MMOL/L (ref 135–145)
SOURCE: NORMAL
TOTAL IMMATURE NEUTOROPHIL: 0.06 K/CU MM
TOTAL NUCLEATED RBC: 0 K/CU MM
TROPONIN, HIGH SENSITIVITY: 16 NG/L (ref 0–14)
TROPONIN, HIGH SENSITIVITY: 16 NG/L (ref 0–14)
TSH SERPL DL<=0.005 MIU/L-ACNC: 2.41 UIU/ML (ref 0.27–4.2)
WBC # BLD: 12.2 K/CU MM (ref 4–10.5)

## 2024-03-03 PROCEDURE — 93005 ELECTROCARDIOGRAM TRACING: CPT | Performed by: STUDENT IN AN ORGANIZED HEALTH CARE EDUCATION/TRAINING PROGRAM

## 2024-03-03 PROCEDURE — 84443 ASSAY THYROID STIM HORMONE: CPT

## 2024-03-03 PROCEDURE — 84145 PROCALCITONIN (PCT): CPT

## 2024-03-03 PROCEDURE — 84484 ASSAY OF TROPONIN QUANT: CPT

## 2024-03-03 PROCEDURE — 85025 COMPLETE CBC W/AUTO DIFF WBC: CPT

## 2024-03-03 PROCEDURE — 87635 SARS-COV-2 COVID-19 AMP PRB: CPT

## 2024-03-03 PROCEDURE — 80307 DRUG TEST PRSMV CHEM ANLYZR: CPT

## 2024-03-03 PROCEDURE — 71046 X-RAY EXAM CHEST 2 VIEWS: CPT

## 2024-03-03 PROCEDURE — 83880 ASSAY OF NATRIURETIC PEPTIDE: CPT

## 2024-03-03 PROCEDURE — 80048 BASIC METABOLIC PNL TOTAL CA: CPT

## 2024-03-03 PROCEDURE — 99285 EMERGENCY DEPT VISIT HI MDM: CPT

## 2024-03-03 PROCEDURE — 87502 INFLUENZA DNA AMP PROBE: CPT

## 2024-03-03 PROCEDURE — G0480 DRUG TEST DEF 1-7 CLASSES: HCPCS

## 2024-03-03 ASSESSMENT — PAIN - FUNCTIONAL ASSESSMENT: PAIN_FUNCTIONAL_ASSESSMENT: NONE - DENIES PAIN

## 2024-03-04 LAB
EKG DIAGNOSIS: NORMAL
EKG Q-T INTERVAL: 352 MS
EKG QRS DURATION: 92 MS
EKG QTC CALCULATION (BAZETT): 435 MS
EKG R AXIS: -16 DEGREES
EKG T AXIS: 107 DEGREES
EKG VENTRICULAR RATE: 92 BPM

## 2024-03-04 PROCEDURE — 93010 ELECTROCARDIOGRAM REPORT: CPT | Performed by: INTERNAL MEDICINE

## 2024-03-04 NOTE — ACP (ADVANCE CARE PLANNING)
Patient does not have any ACP documents/Medical Power of . Patient does have a FINANCIAL only POA on file, but not a MPOA.     LSW notes hospital will follow [place]'s Next of Kin hierarchy in the following descending order for priority:    Guardian  Spouse  Majority of adult Children  Parents  Majority of adult Siblings  Nearest Relative not described above    Per [place]'s Next of Kin hierarchy: Patients' Brother/Sister will be Primary Healthcare Decision Maker.

## 2024-03-04 NOTE — DISCHARGE INSTRUCTIONS
Continue taking your medications as prescribed.  Call and follow-up with your cardiology regarding your symptoms from atrial fibrillation  Call and follow-up with your family doctor in the next 3-5 days  Return to the ED if your symptoms worsen or you feel you need to be reevaluated

## 2024-03-07 ENCOUNTER — OFFICE VISIT (OUTPATIENT)
Dept: CARDIOLOGY CLINIC | Age: 74
End: 2024-03-07
Payer: MEDICARE

## 2024-03-07 VITALS
SYSTOLIC BLOOD PRESSURE: 126 MMHG | WEIGHT: 293 LBS | BODY MASS INDEX: 44.41 KG/M2 | HEART RATE: 56 BPM | HEIGHT: 68 IN | DIASTOLIC BLOOD PRESSURE: 74 MMHG

## 2024-03-07 DIAGNOSIS — E11.8 TYPE 2 DIABETES MELLITUS WITH COMPLICATION, WITHOUT LONG-TERM CURRENT USE OF INSULIN (HCC): ICD-10-CM

## 2024-03-07 DIAGNOSIS — I10 PRIMARY HYPERTENSION: ICD-10-CM

## 2024-03-07 DIAGNOSIS — I48.0 PAROXYSMAL ATRIAL FIBRILLATION (HCC): ICD-10-CM

## 2024-03-07 DIAGNOSIS — E66.01 MORBID OBESITY (HCC): ICD-10-CM

## 2024-03-07 DIAGNOSIS — I25.10 CORONARY ARTERY DISEASE INVOLVING NATIVE CORONARY ARTERY OF NATIVE HEART WITHOUT ANGINA PECTORIS: Primary | ICD-10-CM

## 2024-03-07 PROCEDURE — 2022F DILAT RTA XM EVC RTNOPTHY: CPT | Performed by: INTERNAL MEDICINE

## 2024-03-07 PROCEDURE — G8427 DOCREV CUR MEDS BY ELIG CLIN: HCPCS | Performed by: INTERNAL MEDICINE

## 2024-03-07 PROCEDURE — 3078F DIAST BP <80 MM HG: CPT | Performed by: INTERNAL MEDICINE

## 2024-03-07 PROCEDURE — 3074F SYST BP LT 130 MM HG: CPT | Performed by: INTERNAL MEDICINE

## 2024-03-07 PROCEDURE — G8484 FLU IMMUNIZE NO ADMIN: HCPCS | Performed by: INTERNAL MEDICINE

## 2024-03-07 PROCEDURE — 1090F PRES/ABSN URINE INCON ASSESS: CPT | Performed by: INTERNAL MEDICINE

## 2024-03-07 PROCEDURE — G8400 PT W/DXA NO RESULTS DOC: HCPCS | Performed by: INTERNAL MEDICINE

## 2024-03-07 PROCEDURE — 99214 OFFICE O/P EST MOD 30 MIN: CPT | Performed by: INTERNAL MEDICINE

## 2024-03-07 PROCEDURE — 3017F COLORECTAL CA SCREEN DOC REV: CPT | Performed by: INTERNAL MEDICINE

## 2024-03-07 PROCEDURE — 3046F HEMOGLOBIN A1C LEVEL >9.0%: CPT | Performed by: INTERNAL MEDICINE

## 2024-03-07 PROCEDURE — 1123F ACP DISCUSS/DSCN MKR DOCD: CPT | Performed by: INTERNAL MEDICINE

## 2024-03-07 PROCEDURE — G8417 CALC BMI ABV UP PARAM F/U: HCPCS | Performed by: INTERNAL MEDICINE

## 2024-03-07 PROCEDURE — 1036F TOBACCO NON-USER: CPT | Performed by: INTERNAL MEDICINE

## 2024-03-07 NOTE — PATIENT INSTRUCTIONS
CORONARY ARTERY DISEASE:Yes. Patient does not think her chest pain is her heart. Says it is all stress.  clinically stable. Patient is on optimal medical regimen ( see medication list above )  Patient is currently  /symptomatic from CAD.   -changes in  treatment:   no. Patient is being treated with ASA & Coreg  Counseled regarding regular exercise & its benefits.  -Testing ordered:  no  [place] classification: 1  4/2021 reports from [facility] suggests, mild to moderate inferior & lateral wall Ischemia. LV function was normal.  - changes in  treatment:   no. Patient is on clonidine  Counseled regarding low salt diet, exercise & weight control.     EKG: NSR 60/min, WNL     HYPERTENSION: ? NOT WELL CONTROLLED. Says BP is always good at home.      VALVULAR HEART DISEASE:    No significant VHD noted  9/2022 ECHO:   Limited study due to patients body habitus.   Left ventricular function and size is normal, EF is estimated at 55-60%.   Mild left ventricular hypertrophy. E/A reversal; indeterminate diastolic   function.   Moderately dilated left atrium.   Sclerotic, but non-stenotic aortic valve.   Mitral annular calcification is present.   Mild mitral, pulmonic and tricuspid regurgitation is present.   RVSP is 32 mmHg.   No evidence of pericardial effusion.     DYSLIPIDEMIA: yes,   Patient's profile is at / near Goal.no  HDL is low   Tolerating current medical regimen wellyes, . Takes Tricor & Zocor  Does not tolerate medications well due to side effects  See most recent Lab values:( Reviewed Labs from family Dr. CONSTANCE     )  LDL is 108  HDL is 42     ARRHYTHMIAS:yes,   Patient has H/O Atrial fibrillation  She is rate controlled & on anticoagulation. Takes Xarelto  Patient has been having paroxysmal atrial fibrillation episodes where in her heart rate does go fast while she is in sinus rhythm her heart rate is in 50s usually this is probably suggesting possibility of sick sinus syndrome.  Patient has not been

## 2024-03-07 NOTE — PROGRESS NOTES
detail with the patient. Discussed any side effects or problems taking the medication.     Recommend Continue present management & medications as listed.     AFFIRMATION: I reviewed patient's history, previous & current medical problems & all Labs + testing. This includes chart prep even prior to the vosit. Various goals are discussed and multiple questions answered.Relevant concelling performed.     Office follow up in 3 months.

## 2024-03-12 ENCOUNTER — TELEPHONE (OUTPATIENT)
Dept: CARDIOLOGY CLINIC | Age: 74
End: 2024-03-12

## 2024-03-13 ENCOUNTER — OFFICE VISIT (OUTPATIENT)
Dept: CARDIOLOGY CLINIC | Age: 74
End: 2024-03-13
Payer: MEDICARE

## 2024-03-13 VITALS
SYSTOLIC BLOOD PRESSURE: 138 MMHG | DIASTOLIC BLOOD PRESSURE: 66 MMHG | WEIGHT: 293 LBS | HEART RATE: 58 BPM | BODY MASS INDEX: 49.05 KG/M2

## 2024-03-13 DIAGNOSIS — E11.8 TYPE 2 DIABETES MELLITUS WITH COMPLICATION, WITHOUT LONG-TERM CURRENT USE OF INSULIN (HCC): ICD-10-CM

## 2024-03-13 DIAGNOSIS — I25.10 CORONARY ARTERY DISEASE INVOLVING NATIVE CORONARY ARTERY OF NATIVE HEART WITHOUT ANGINA PECTORIS: Primary | ICD-10-CM

## 2024-03-13 DIAGNOSIS — I48.0 PAROXYSMAL ATRIAL FIBRILLATION (HCC): ICD-10-CM

## 2024-03-13 DIAGNOSIS — E66.01 MORBID OBESITY (HCC): ICD-10-CM

## 2024-03-13 DIAGNOSIS — I10 PRIMARY HYPERTENSION: ICD-10-CM

## 2024-03-13 PROCEDURE — G8417 CALC BMI ABV UP PARAM F/U: HCPCS | Performed by: INTERNAL MEDICINE

## 2024-03-13 PROCEDURE — 3075F SYST BP GE 130 - 139MM HG: CPT | Performed by: INTERNAL MEDICINE

## 2024-03-13 PROCEDURE — 3046F HEMOGLOBIN A1C LEVEL >9.0%: CPT | Performed by: INTERNAL MEDICINE

## 2024-03-13 PROCEDURE — G8427 DOCREV CUR MEDS BY ELIG CLIN: HCPCS | Performed by: INTERNAL MEDICINE

## 2024-03-13 PROCEDURE — 3078F DIAST BP <80 MM HG: CPT | Performed by: INTERNAL MEDICINE

## 2024-03-13 PROCEDURE — 93000 ELECTROCARDIOGRAM COMPLETE: CPT | Performed by: INTERNAL MEDICINE

## 2024-03-13 PROCEDURE — 99211 OFF/OP EST MAY X REQ PHY/QHP: CPT | Performed by: INTERNAL MEDICINE

## 2024-03-13 NOTE — PROGRESS NOTES
On Multaq.  Patient back in NSR 58/min.QTc 413.  Suggest CPM.  Keep F/U appointment.  will refer her to Dr. Maldonado for further management plans especially in the long run if patient needs a pacemaker placement.

## 2024-03-15 ENCOUNTER — TELEPHONE (OUTPATIENT)
Dept: CARDIOLOGY CLINIC | Age: 74
End: 2024-03-15

## 2024-03-15 NOTE — TELEPHONE ENCOUNTER
Called patient to schedule consult with dr Maldonado, per patient she stated Dr Govea stated she did not see at this time. I did confirm verbally with Dr Govea.

## 2024-06-13 ENCOUNTER — OFFICE VISIT (OUTPATIENT)
Dept: CARDIOLOGY CLINIC | Age: 74
End: 2024-06-13
Payer: MEDICARE

## 2024-06-13 VITALS
SYSTOLIC BLOOD PRESSURE: 112 MMHG | BODY MASS INDEX: 45.99 KG/M2 | DIASTOLIC BLOOD PRESSURE: 84 MMHG | HEART RATE: 65 BPM | HEIGHT: 67 IN | OXYGEN SATURATION: 96 % | WEIGHT: 293 LBS

## 2024-06-13 DIAGNOSIS — I25.10 CORONARY ARTERY DISEASE INVOLVING NATIVE CORONARY ARTERY OF NATIVE HEART WITHOUT ANGINA PECTORIS: Primary | ICD-10-CM

## 2024-06-13 DIAGNOSIS — E66.01 MORBID OBESITY (HCC): ICD-10-CM

## 2024-06-13 DIAGNOSIS — I48.0 PAROXYSMAL ATRIAL FIBRILLATION (HCC): ICD-10-CM

## 2024-06-13 DIAGNOSIS — E11.8 TYPE 2 DIABETES MELLITUS WITH COMPLICATION, WITHOUT LONG-TERM CURRENT USE OF INSULIN (HCC): ICD-10-CM

## 2024-06-13 PROCEDURE — 3079F DIAST BP 80-89 MM HG: CPT | Performed by: INTERNAL MEDICINE

## 2024-06-13 PROCEDURE — G8400 PT W/DXA NO RESULTS DOC: HCPCS | Performed by: INTERNAL MEDICINE

## 2024-06-13 PROCEDURE — G8427 DOCREV CUR MEDS BY ELIG CLIN: HCPCS | Performed by: INTERNAL MEDICINE

## 2024-06-13 PROCEDURE — G8417 CALC BMI ABV UP PARAM F/U: HCPCS | Performed by: INTERNAL MEDICINE

## 2024-06-13 PROCEDURE — 2022F DILAT RTA XM EVC RTNOPTHY: CPT | Performed by: INTERNAL MEDICINE

## 2024-06-13 PROCEDURE — 1036F TOBACCO NON-USER: CPT | Performed by: INTERNAL MEDICINE

## 2024-06-13 PROCEDURE — 3017F COLORECTAL CA SCREEN DOC REV: CPT | Performed by: INTERNAL MEDICINE

## 2024-06-13 PROCEDURE — 99213 OFFICE O/P EST LOW 20 MIN: CPT | Performed by: INTERNAL MEDICINE

## 2024-06-13 PROCEDURE — 3074F SYST BP LT 130 MM HG: CPT | Performed by: INTERNAL MEDICINE

## 2024-06-13 PROCEDURE — 3046F HEMOGLOBIN A1C LEVEL >9.0%: CPT | Performed by: INTERNAL MEDICINE

## 2024-06-13 PROCEDURE — 1123F ACP DISCUSS/DSCN MKR DOCD: CPT | Performed by: INTERNAL MEDICINE

## 2024-06-13 PROCEDURE — 1090F PRES/ABSN URINE INCON ASSESS: CPT | Performed by: INTERNAL MEDICINE

## 2024-06-13 NOTE — PROGRESS NOTES
currently taking is reviewed in detail with the patient. Discussed any side effects or problems taking the medication.     Recommend Continue present management & medications as listed.     AFFIRMATION: I spent at least 20 minutes of time reviewing patient's history, previous & current medical problems & all Labs + testing. This includes chart prep even prior to the vosit. Various goals are discussed and multiple questions answered.Relevant concelling performed.     Office follow up in six months.

## 2024-06-13 NOTE — PATIENT INSTRUCTIONS
CORONARY ARTERY DISEASE:Yes. Patient does not think her chest pain is her heart. Says it is all stress.  clinically stable. Patient is on optimal medical regimen ( see medication list above )  Patient is currently  /symptomatic from CAD.   -changes in  treatment:   no. Patient is being treated with ASA & Coreg  Counseled regarding regular exercise & its benefits.  -Testing ordered:  no  [place] classification: 1  4/2021 reports from [facility] suggests, mild to moderate inferior & lateral wall Ischemia. LV function was normal.  - changes in  treatment:   no. Patient is on clonidine  Counseled regarding low salt diet, exercise & weight control.     EKG: NSR 60/min, WNL     HYPERTENSION: ? NOT WELL CONTROLLED. Says BP is always good at home.      VALVULAR HEART DISEASE:    No significant VHD noted  3/7/2022     Left Ventricle: Normal left ventricular systolic function with a visually estimated EF of 55 - 60%. Left ventricle size is normal. Mildly increased wall thickness. Normal wall motion. Grade I diastolic dysfunction with normal LAP.    Mitral Valve: Annular calcification of the mitral valve. Mild to moderate regurgitation.    Tricuspid Valve: Mild regurgitation. The estimated RVSP is 29 mmHg.    Left Atrium: Left atrium is mildly dilated.    Pericardium: No pericardial effusion.    Image quality is fair.    DYSLIPIDEMIA: yes,   Patient's profile is at / near Goal.no  HDL is low   Tolerating current medical regimen wellyes, . Takes Tricor & Zocor  Does not tolerate medications well due to side effects  See most recent Lab values:( Reviewed Labs from family Dr. CONSTANCE     )  LDL is 108  HDL is 42     ARRHYTHMIAS:yes,   Patient has H/O Atrial fibrillation  She is rate controlled & on anticoagulation. Takes Xarelto  Patient has been having paroxysmal atrial fibrillation episodes where in her heart rate does go fast while she is in sinus rhythm her heart rate is in 50s usually this is probably suggesting

## 2024-10-03 ENCOUNTER — HOSPITAL ENCOUNTER (OUTPATIENT)
Age: 74
Discharge: HOME | End: 2024-10-03
Payer: MEDICARE

## 2024-10-03 DIAGNOSIS — F41.9 ANXIETY: ICD-10-CM

## 2024-10-03 DIAGNOSIS — I10 PRIMARY HYPERTENSION: ICD-10-CM

## 2024-10-03 DIAGNOSIS — E66.3 PATIENT OVERWEIGHT: ICD-10-CM

## 2024-10-03 DIAGNOSIS — I48.0 PAROXYSMAL ATRIAL FIBRILLATION (HCC): ICD-10-CM

## 2024-10-03 DIAGNOSIS — N18.2 CHRONIC KIDNEY DISEASE, STAGE II (MILD): ICD-10-CM

## 2024-10-03 DIAGNOSIS — E11.8 TYPE 2 DIABETES MELLITUS WITH COMPLICATION, WITHOUT LONG-TERM CURRENT USE OF INSULIN (HCC): ICD-10-CM

## 2024-10-03 LAB
ALBUMIN SERPL-MCNC: 4 G/DL (ref 3.4–5)
ANION GAP SERPL CALCULATED.3IONS-SCNC: 10 MMOL/L (ref 9–17)
BILIRUB UR QL STRIP: NEGATIVE
BUN SERPL-MCNC: 24 MG/DL (ref 7–20)
CALCIUM SERPL-MCNC: 9.6 MG/DL (ref 8.3–10.6)
CHLORIDE SERPL-SCNC: 105 MMOL/L (ref 99–110)
CLARITY UR: CLEAR
CO2 SERPL-SCNC: 24 MMOL/L (ref 21–32)
COLOR UR: YELLOW
COMMENT: NORMAL
CREAT SERPL-MCNC: 1.2 MG/DL (ref 0.6–1.2)
CREAT UR-MCNC: 49.4 MG/DL (ref 28–217)
GFR, ESTIMATED: 44 ML/MIN/1.73M2
GLUCOSE SERPL-MCNC: 186 MG/DL (ref 74–99)
GLUCOSE UR STRIP-MCNC: NEGATIVE MG/DL
HGB UR QL STRIP.AUTO: NEGATIVE
KETONES UR STRIP-MCNC: NEGATIVE MG/DL
LEUKOCYTE ESTERASE UR QL STRIP: NEGATIVE
MAGNESIUM SERPL-MCNC: 2.3 MG/DL (ref 1.8–2.4)
NITRITE UR QL STRIP: NEGATIVE
PH UR STRIP: 7.5 [PH] (ref 5–8)
PHOSPHATE SERPL-MCNC: 2.9 MG/DL (ref 2.5–4.9)
POTASSIUM SERPL-SCNC: 4.5 MMOL/L (ref 3.5–5.1)
PROT UR STRIP-MCNC: NEGATIVE MG/DL
SODIUM SERPL-SCNC: 139 MMOL/L (ref 136–145)
SP GR UR STRIP: 1.01 (ref 1–1.03)
TOTAL PROTEIN, URINE: 15 MG/DL
URINE TOTAL PROTEIN CREATININE RATIO: 0.31 (ref 0–0.2)
UROBILINOGEN UR STRIP-ACNC: 0.2 EU/DL (ref 0–1)

## 2024-10-03 PROCEDURE — 36415 COLL VENOUS BLD VENIPUNCTURE: CPT

## 2024-10-03 PROCEDURE — 84156 ASSAY OF PROTEIN URINE: CPT

## 2024-10-03 PROCEDURE — 82040 ASSAY OF SERUM ALBUMIN: CPT

## 2024-10-03 PROCEDURE — 81003 URINALYSIS AUTO W/O SCOPE: CPT

## 2024-10-03 PROCEDURE — 82570 ASSAY OF URINE CREATININE: CPT

## 2024-10-03 PROCEDURE — 84100 ASSAY OF PHOSPHORUS: CPT

## 2024-10-03 PROCEDURE — 80048 BASIC METABOLIC PNL TOTAL CA: CPT

## 2024-10-03 PROCEDURE — 83735 ASSAY OF MAGNESIUM: CPT
